# Patient Record
Sex: MALE | Race: WHITE | HISPANIC OR LATINO | Employment: OTHER | URBAN - METROPOLITAN AREA
[De-identification: names, ages, dates, MRNs, and addresses within clinical notes are randomized per-mention and may not be internally consistent; named-entity substitution may affect disease eponyms.]

---

## 2017-05-04 ENCOUNTER — HOSPITAL ENCOUNTER (EMERGENCY)
Facility: HOSPITAL | Age: 67
Discharge: HOME | End: 2017-05-04
Attending: EMERGENCY MEDICINE
Payer: MEDICARE

## 2017-05-04 ENCOUNTER — GENERIC CONVERSION - ENCOUNTER (OUTPATIENT)
Dept: OTHER | Facility: OTHER | Age: 67
End: 2017-05-04

## 2017-05-04 VITALS
HEIGHT: 67 IN | WEIGHT: 172 LBS | HEART RATE: 80 BPM | DIASTOLIC BLOOD PRESSURE: 99 MMHG | RESPIRATION RATE: 16 BRPM | SYSTOLIC BLOOD PRESSURE: 175 MMHG | BODY MASS INDEX: 27 KG/M2 | OXYGEN SATURATION: 97 %

## 2017-05-04 DIAGNOSIS — K64.5 EXTERNAL HEMORRHOID, THROMBOSED: Primary | ICD-10-CM

## 2017-05-04 PROCEDURE — 99283 EMERGENCY DEPT VISIT LOW MDM: CPT

## 2017-05-04 RX ORDER — LIDOCAINE 50 MG/G
1 OINTMENT TOPICAL 3 TIMES DAILY PRN
Qty: 35.44 G | Refills: 0 | Status: ON HOLD | OUTPATIENT
Start: 2017-05-04 | End: 2017-07-07

## 2017-05-08 ENCOUNTER — LAB REQUISITION (OUTPATIENT)
Dept: LAB | Facility: HOSPITAL | Age: 67
End: 2017-05-08
Payer: MEDICARE

## 2017-05-08 DIAGNOSIS — K64.5 PERIANAL VENOUS THROMBOSIS: ICD-10-CM

## 2017-05-08 PROCEDURE — 88304 TISSUE EXAM BY PATHOLOGIST: CPT | Performed by: COLON & RECTAL SURGERY

## 2017-07-06 ENCOUNTER — ANESTHESIA EVENT (OUTPATIENT)
Dept: GASTROENTEROLOGY | Facility: HOSPITAL | Age: 67
End: 2017-07-06
Payer: MEDICARE

## 2017-07-06 ENCOUNTER — GENERIC CONVERSION - ENCOUNTER (OUTPATIENT)
Dept: OTHER | Facility: OTHER | Age: 67
End: 2017-07-06

## 2017-07-06 ENCOUNTER — ALLSCRIPTS OFFICE VISIT (OUTPATIENT)
Dept: OTHER | Facility: OTHER | Age: 67
End: 2017-07-06

## 2017-07-07 ENCOUNTER — ANESTHESIA (OUTPATIENT)
Dept: GASTROENTEROLOGY | Facility: HOSPITAL | Age: 67
End: 2017-07-07
Payer: MEDICARE

## 2017-07-07 ENCOUNTER — HOSPITAL ENCOUNTER (OUTPATIENT)
Facility: HOSPITAL | Age: 67
Setting detail: OUTPATIENT SURGERY
Discharge: HOME | End: 2017-07-07
Attending: COLON & RECTAL SURGERY | Admitting: COLON & RECTAL SURGERY
Payer: MEDICARE

## 2017-07-07 VITALS
SYSTOLIC BLOOD PRESSURE: 129 MMHG | DIASTOLIC BLOOD PRESSURE: 82 MMHG | TEMPERATURE: 97 F | RESPIRATION RATE: 18 BRPM | OXYGEN SATURATION: 96 % | HEIGHT: 67 IN | HEART RATE: 71 BPM | BODY MASS INDEX: 27.61 KG/M2 | WEIGHT: 175.93 LBS

## 2017-07-07 RX ORDER — VITAMIN B COMPLEX
1 CAPSULE ORAL DAILY
COMMUNITY

## 2017-07-07 RX ORDER — FLUTICASONE PROPIONATE 44 UG/1
1 AEROSOL, METERED RESPIRATORY (INHALATION) 2 TIMES DAILY
COMMUNITY
End: 2018-05-16

## 2017-07-07 RX ORDER — PROPOFOL 10 MG/ML
INJECTION, EMULSION INTRAVENOUS AS NEEDED
Status: DISCONTINUED | OUTPATIENT
Start: 2017-07-07 | End: 2017-07-07 | Stop reason: SURG

## 2017-07-07 RX ORDER — MULTIVIT-MIN/IRON FUM/FOLIC AC 7.5 MG-4
1 TABLET ORAL DAILY
COMMUNITY

## 2017-07-07 RX ORDER — SODIUM CHLORIDE, SODIUM LACTATE, POTASSIUM CHLORIDE, CALCIUM CHLORIDE 600; 310; 30; 20 MG/100ML; MG/100ML; MG/100ML; MG/100ML
100 INJECTION, SOLUTION INTRAVENOUS CONTINUOUS
Status: DISCONTINUED | OUTPATIENT
Start: 2017-07-07 | End: 2017-07-07 | Stop reason: HOSPADM

## 2017-07-07 RX ADMIN — PROPOFOL 50 MG: 10 INJECTION, EMULSION INTRAVENOUS at 12:35

## 2017-07-07 RX ADMIN — SODIUM CHLORIDE, SODIUM LACTATE, POTASSIUM CHLORIDE, AND CALCIUM CHLORIDE 100 ML/HR: .6; .31; .03; .02 INJECTION, SOLUTION INTRAVENOUS at 12:26

## 2017-07-07 RX ADMIN — PROPOFOL 100 MG: 10 INJECTION, EMULSION INTRAVENOUS at 12:31

## 2017-07-07 RX ADMIN — PROPOFOL 50 MG: 10 INJECTION, EMULSION INTRAVENOUS at 12:38

## 2018-01-13 VITALS
OXYGEN SATURATION: 97 % | WEIGHT: 175 LBS | TEMPERATURE: 98.4 F | DIASTOLIC BLOOD PRESSURE: 88 MMHG | BODY MASS INDEX: 29.16 KG/M2 | HEIGHT: 65 IN | SYSTOLIC BLOOD PRESSURE: 130 MMHG | HEART RATE: 68 BPM

## 2018-04-23 ENCOUNTER — TRANSCRIBE ORDERS (OUTPATIENT)
Dept: INTERNAL MEDICINE CLINIC | Facility: CLINIC | Age: 68
End: 2018-04-23

## 2018-04-23 DIAGNOSIS — Z86.19 HISTORY OF HEPATITIS A VIRUS INFECTION: ICD-10-CM

## 2018-04-23 DIAGNOSIS — K21.9 GASTROESOPHAGEAL REFLUX DISEASE WITHOUT ESOPHAGITIS: ICD-10-CM

## 2018-04-23 DIAGNOSIS — Z86.74 HISTORY OF CARDIAC ARREST: ICD-10-CM

## 2018-04-23 DIAGNOSIS — Z86.73 HISTORY OF STROKE: ICD-10-CM

## 2018-04-23 DIAGNOSIS — G43.501 PERSISTENT MIGRAINE AURA WITHOUT CEREBRAL INFARCTION AND WITH STATUS MIGRAINOSUS, NOT INTRACTABLE: ICD-10-CM

## 2018-04-23 DIAGNOSIS — Z12.5 SCREENING FOR MALIGNANT NEOPLASM OF PROSTATE: ICD-10-CM

## 2018-04-23 DIAGNOSIS — J45.40 MODERATE PERSISTENT ASTHMA, UNSPECIFIED WHETHER COMPLICATED: ICD-10-CM

## 2018-04-23 DIAGNOSIS — N40.0 BENIGN PROSTATIC HYPERPLASIA WITHOUT LOWER URINARY TRACT SYMPTOMS: Primary | ICD-10-CM

## 2018-04-23 RX ORDER — FLUTICASONE PROPIONATE 44 UG/1
AEROSOL, METERED RESPIRATORY (INHALATION)
COMMUNITY
End: 2018-05-16 | Stop reason: SDUPTHER

## 2018-04-23 RX ORDER — ACETAMINOPHEN, ASPIRIN AND CAFFEINE 250; 250; 65 MG/1; MG/1; MG/1
1 TABLET, FILM COATED ORAL EVERY 6 HOURS PRN
COMMUNITY

## 2018-04-25 ENCOUNTER — TELEPHONE (OUTPATIENT)
Dept: INTERNAL MEDICINE CLINIC | Facility: CLINIC | Age: 68
End: 2018-04-25

## 2018-04-25 DIAGNOSIS — Z87.442 HX OF RENAL CALCULI: Primary | ICD-10-CM

## 2018-04-25 DIAGNOSIS — N40.0 BENIGN PROSTATIC HYPERPLASIA WITHOUT LOWER URINARY TRACT SYMPTOMS: ICD-10-CM

## 2018-04-26 ENCOUNTER — APPOINTMENT (OUTPATIENT)
Dept: LAB | Facility: CLINIC | Age: 68
End: 2018-04-26
Payer: MEDICARE

## 2018-04-26 DIAGNOSIS — Z12.5 SCREENING FOR MALIGNANT NEOPLASM OF PROSTATE: ICD-10-CM

## 2018-04-26 DIAGNOSIS — J45.40 MODERATE PERSISTENT ASTHMA, UNSPECIFIED WHETHER COMPLICATED: ICD-10-CM

## 2018-04-26 DIAGNOSIS — Z86.19 HISTORY OF HEPATITIS A VIRUS INFECTION: ICD-10-CM

## 2018-04-26 DIAGNOSIS — G43.501 PERSISTENT MIGRAINE AURA WITHOUT CEREBRAL INFARCTION AND WITH STATUS MIGRAINOSUS, NOT INTRACTABLE: ICD-10-CM

## 2018-04-26 DIAGNOSIS — Z86.73 HISTORY OF STROKE: ICD-10-CM

## 2018-04-26 DIAGNOSIS — N40.0 BENIGN PROSTATIC HYPERPLASIA WITHOUT LOWER URINARY TRACT SYMPTOMS: ICD-10-CM

## 2018-04-26 DIAGNOSIS — Z86.74 HISTORY OF CARDIAC ARREST: ICD-10-CM

## 2018-04-26 DIAGNOSIS — K21.9 GASTROESOPHAGEAL REFLUX DISEASE WITHOUT ESOPHAGITIS: ICD-10-CM

## 2018-04-26 LAB
ALBUMIN SERPL BCP-MCNC: 3.8 G/DL (ref 3.5–5)
ALP SERPL-CCNC: 51 U/L (ref 46–116)
ALT SERPL W P-5'-P-CCNC: 47 U/L (ref 12–78)
ANION GAP SERPL CALCULATED.3IONS-SCNC: 7 MMOL/L (ref 4–13)
AST SERPL W P-5'-P-CCNC: 26 U/L (ref 5–45)
BACTERIA UR QL AUTO: ABNORMAL /HPF
BASOPHILS # BLD AUTO: 0.03 THOUSANDS/ΜL (ref 0–0.1)
BASOPHILS NFR BLD AUTO: 1 % (ref 0–1)
BILIRUB DIRECT SERPL-MCNC: 0.14 MG/DL (ref 0–0.2)
BILIRUB SERPL-MCNC: 0.6 MG/DL (ref 0.2–1)
BILIRUB UR QL STRIP: NEGATIVE
BUN SERPL-MCNC: 15 MG/DL (ref 5–25)
CALCIUM SERPL-MCNC: 8.4 MG/DL (ref 8.3–10.1)
CHLORIDE SERPL-SCNC: 101 MMOL/L (ref 100–108)
CHOLEST SERPL-MCNC: 166 MG/DL (ref 50–200)
CLARITY UR: CLEAR
CO2 SERPL-SCNC: 30 MMOL/L (ref 21–32)
COLOR UR: YELLOW
CREAT SERPL-MCNC: 0.89 MG/DL (ref 0.6–1.3)
EOSINOPHIL # BLD AUTO: 0.01 THOUSAND/ΜL (ref 0–0.61)
EOSINOPHIL NFR BLD AUTO: 0 % (ref 0–6)
ERYTHROCYTE [DISTWIDTH] IN BLOOD BY AUTOMATED COUNT: 12.1 % (ref 11.6–15.1)
GFR SERPL CREATININE-BSD FRML MDRD: 87 ML/MIN/1.73SQ M
GLUCOSE P FAST SERPL-MCNC: 94 MG/DL (ref 65–99)
GLUCOSE UR STRIP-MCNC: NEGATIVE MG/DL
HCT VFR BLD AUTO: 45.9 % (ref 36.5–49.3)
HDLC SERPL-MCNC: 51 MG/DL (ref 40–60)
HGB BLD-MCNC: 15.6 G/DL (ref 12–17)
HGB UR QL STRIP.AUTO: ABNORMAL
KETONES UR STRIP-MCNC: NEGATIVE MG/DL
LDLC SERPL CALC-MCNC: 82 MG/DL (ref 0–100)
LEUKOCYTE ESTERASE UR QL STRIP: NEGATIVE
LYMPHOCYTES # BLD AUTO: 1.4 THOUSANDS/ΜL (ref 0.6–4.47)
LYMPHOCYTES NFR BLD AUTO: 34 % (ref 14–44)
MCH RBC QN AUTO: 29.6 PG (ref 26.8–34.3)
MCHC RBC AUTO-ENTMCNC: 34 G/DL (ref 31.4–37.4)
MCV RBC AUTO: 87 FL (ref 82–98)
MONOCYTES # BLD AUTO: 0.6 THOUSAND/ΜL (ref 0.17–1.22)
MONOCYTES NFR BLD AUTO: 14 % (ref 4–12)
NEUTROPHILS # BLD AUTO: 2.14 THOUSANDS/ΜL (ref 1.85–7.62)
NEUTS SEG NFR BLD AUTO: 51 % (ref 43–75)
NITRITE UR QL STRIP: NEGATIVE
NON-SQ EPI CELLS URNS QL MICRO: ABNORMAL /HPF
NONHDLC SERPL-MCNC: 115 MG/DL
PH UR STRIP.AUTO: 6 [PH] (ref 4.5–8)
PLATELET # BLD AUTO: 161 THOUSANDS/UL (ref 149–390)
PMV BLD AUTO: 9.9 FL (ref 8.9–12.7)
POTASSIUM SERPL-SCNC: 3.8 MMOL/L (ref 3.5–5.3)
PROT SERPL-MCNC: 6.9 G/DL (ref 6.4–8.2)
PROT UR STRIP-MCNC: NEGATIVE MG/DL
PSA SERPL-MCNC: 0.8 NG/ML (ref 0–4)
RBC # BLD AUTO: 5.27 MILLION/UL (ref 3.88–5.62)
RBC #/AREA URNS AUTO: ABNORMAL /HPF
SODIUM SERPL-SCNC: 138 MMOL/L (ref 136–145)
SP GR UR STRIP.AUTO: 1.01 (ref 1–1.03)
TRIGL SERPL-MCNC: 165 MG/DL
UROBILINOGEN UR QL STRIP.AUTO: 0.2 E.U./DL
WBC # BLD AUTO: 4.18 THOUSAND/UL (ref 4.31–10.16)
WBC #/AREA URNS AUTO: ABNORMAL /HPF

## 2018-04-26 PROCEDURE — 80061 LIPID PANEL: CPT

## 2018-04-26 PROCEDURE — 82248 BILIRUBIN DIRECT: CPT

## 2018-04-26 PROCEDURE — 36415 COLL VENOUS BLD VENIPUNCTURE: CPT

## 2018-04-26 PROCEDURE — 81001 URINALYSIS AUTO W/SCOPE: CPT

## 2018-04-26 PROCEDURE — 84153 ASSAY OF PSA TOTAL: CPT

## 2018-04-26 PROCEDURE — 80053 COMPREHEN METABOLIC PANEL: CPT

## 2018-04-26 PROCEDURE — 85025 COMPLETE CBC W/AUTO DIFF WBC: CPT

## 2018-05-09 ENCOUNTER — OFFICE VISIT (OUTPATIENT)
Dept: INTERNAL MEDICINE CLINIC | Facility: CLINIC | Age: 68
End: 2018-05-09
Payer: MEDICARE

## 2018-05-09 VITALS
OXYGEN SATURATION: 97 % | WEIGHT: 179.2 LBS | BODY MASS INDEX: 30.59 KG/M2 | DIASTOLIC BLOOD PRESSURE: 80 MMHG | HEIGHT: 64 IN | TEMPERATURE: 98.4 F | HEART RATE: 82 BPM | SYSTOLIC BLOOD PRESSURE: 130 MMHG

## 2018-05-09 DIAGNOSIS — G43.009 MIGRAINE WITHOUT AURA AND WITHOUT STATUS MIGRAINOSUS, NOT INTRACTABLE: Primary | ICD-10-CM

## 2018-05-09 DIAGNOSIS — J30.1 SEASONAL ALLERGIC RHINITIS DUE TO POLLEN: ICD-10-CM

## 2018-05-09 DIAGNOSIS — R05.9 COUGH: ICD-10-CM

## 2018-05-09 PROBLEM — K21.9 GERD WITHOUT ESOPHAGITIS: Status: ACTIVE | Noted: 2017-07-06

## 2018-05-09 PROCEDURE — 99214 OFFICE O/P EST MOD 30 MIN: CPT | Performed by: INTERNAL MEDICINE

## 2018-05-09 RX ORDER — BUTALBITAL, ACETAMINOPHEN AND CAFFEINE 50; 325; 40 MG/1; MG/1; MG/1
1 TABLET ORAL EVERY 4 HOURS PRN
Qty: 30 TABLET | Refills: 0 | Status: SHIPPED | OUTPATIENT
Start: 2018-05-09 | End: 2018-11-16

## 2018-05-09 RX ORDER — METHYLPREDNISOLONE 4 MG/1
TABLET ORAL
Qty: 21 TABLET | Refills: 0 | Status: SHIPPED | OUTPATIENT
Start: 2018-05-09 | End: 2018-05-16 | Stop reason: SDUPTHER

## 2018-05-09 NOTE — PROGRESS NOTES
Assessment/Plan:    Cough    Symptoms likely due to post infectious cough syndrome  Recommend he continue with Benadryl and Flonase, as well as as needed albuterol inhaler  Due to symptoms of wheezing, will start steroid taper pack (which will also assist with migraine headaches)  Seasonal allergic rhinitis due to pollen    Continue with Benadryl and Flonase  Migraine without aura and without status migrainosus, not intractable   Will prescribe Fioricet for acute breakthrough headaches as well as a steroid taper pack  Patient advised to contact our offices for worsening of symptoms  Diagnoses and all orders for this visit:    Migraine without aura and without status migrainosus, not intractable  -     butalbital-acetaminophen-caffeine (FIORICET,ESGIC) -40 mg per tablet; Take 1 tablet by mouth every 4 (four) hours as needed for headaches  -     Methylprednisolone 4 MG TBPK; Use as directed on package    Seasonal allergic rhinitis due to pollen    Cough  -     Methylprednisolone 4 MG TBPK; Use as directed on package        Time spent during encounter: 25 minutes  Subjective:      Patient ID: Alexandro Esteban is a 69 y o  male  69year old male is seen today with acute symptoms of worsening migraine headaches  He does report a history of migraine headaches to which back in [facility] he gets approximately 1-2 week however since coming to [facility] to visit family, he has had them daily  He has tried Excedrin migraine without improvement of symptoms  he was visiting [place] in April and went to an urgent care center on 04/30/2018 with symptoms of URI, to which he was diagnosed with acute bronchitis and was treated with antibiotics (azithromycin)  He completed antibiotic therapy however continues to have a persistent cough  He does use an albuterol inhaler as needed for cough, which he admits to using more so than baseline    He also has seasonal allergies to which he takes Benadryl and Flonase  Migraine    This is a recurrent problem  The current episode started 1 to 4 weeks ago  The problem occurs daily  The problem has been unchanged  The pain is located in the left unilateral region  The pain does not radiate  The pain quality is similar to prior headaches  The patient is experiencing no pain  Associated symptoms include coughing, insomnia, nausea, rhinorrhea and vomiting  Pertinent negatives include no abdominal pain, abnormal behavior, anorexia, back pain, blurred vision, dizziness, drainage, ear pain, eye pain, eye redness, eye watering, facial sweating, fever, hearing loss, loss of balance, muscle aches, neck pain, numbness, phonophobia, photophobia, scalp tenderness, seizures, sinus pressure, sore throat, swollen glands, tingling, tinnitus, visual change, weakness or weight loss  Exacerbated by: cleaning products, strong odors  He has tried Excedrin for the symptoms  The treatment provided mild relief  His past medical history is significant for migraine headaches  Cough   This is a new problem  The current episode started 1 to 4 weeks ago  The problem has been unchanged  The problem occurs constantly  The cough is non-productive  Associated symptoms include rhinorrhea, shortness of breath and wheezing  Pertinent negatives include no chest pain, chills, ear pain, eye redness, fever, postnasal drip, sore throat or weight loss  Nothing aggravates the symptoms  There is no history of environmental allergies  The following portions of the patient's history were reviewed and updated as appropriate: allergies, current medications, past family history, past medical history, past social history, past surgical history and problem list     Review of Systems   Constitutional: Negative  Negative for chills, fatigue, fever and weight loss  HENT: Positive for rhinorrhea  Negative for congestion, ear pain, hearing loss, postnasal drip, sinus pressure, sore throat and tinnitus      Eyes: Negative  Negative for blurred vision, photophobia, pain and redness  Respiratory: Positive for cough, shortness of breath and wheezing  Negative for chest tightness  Cardiovascular: Negative for chest pain and palpitations  Gastrointestinal: Positive for nausea and vomiting  Negative for abdominal distention, abdominal pain, anorexia, blood in stool, constipation and diarrhea  Endocrine: Negative  Genitourinary: Negative for difficulty urinating, dysuria and hematuria  Musculoskeletal: Negative  Negative for back pain and neck pain  Skin: Negative  Allergic/Immunologic: Negative for environmental allergies and food allergies  Neurological: Negative  Negative for dizziness, tingling, seizures, weakness, numbness and loss of balance  Hematological: Negative for adenopathy  Psychiatric/Behavioral: Negative for agitation, behavioral problems, confusion and sleep disturbance  The patient has insomnia            Past Medical History:   Diagnosis Date    Asthma     Benign prostatic hyperplasia     Blood transfusion reaction     Bronchitis 04/30/2018    [facility]     [facility]    Diverticulosis     GERD (gastroesophageal reflux disease)     Hepatitis A     History of colonic polyps     Internal bleeding     2000    Migraine     Osteoarthritis     Seizures ([facility] )     Stroke ([facility] )     2001, 2003         Current Outpatient Prescriptions:     aspirin 81 MG tablet, Take by mouth, Disp: , Rfl:     aspirin-acetaminophen-caffeine (EXCEDRIN MIGRAINE) 250-250-65 MG per tablet, Take by mouth, Disp: , Rfl:     b complex vitamins capsule, Take 1 capsule by mouth daily, Disp: , Rfl:     fluticasone (FLOVENT HFA) 44 mcg/act inhaler, Inhale 1 puff 2 (two) times a day, Disp: , Rfl:     fluticasone (FLOVENT HFA) 44 mcg/act inhaler, Inhale, Disp: , Rfl:     Multiple Vitamins-Minerals (MULTIVITAMIN WITH MINERALS) tablet, Take 1 tablet by mouth daily, Disp: , Rfl:     NON FORMULARY, , Disp: , Rfl:     omeprazole (PriLOSEC) 20 mg delayed release capsule, Take 20 mg by mouth daily  , Disp: , Rfl:     butalbital-acetaminophen-caffeine (FIORICET,ESGIC) -40 mg per tablet, Take 1 tablet by mouth every 4 (four) hours as needed for headaches, Disp: 30 tablet, Rfl: 0    Methylprednisolone 4 MG TBPK, Use as directed on package, Disp: 21 tablet, Rfl: 0    Allergies   Allergen Reactions    Chlorzoxazone     Dust Mite Extract     Iodinated Diagnostic Agents     Other      Annotation - [id number]: petrolium derivatives, and perfume    Pollen Extract     Codeine Rash       Social History   Past Surgical History:   Procedure Laterality Date    APPENDECTOMY      DENTAL SURGERY      wisdom tooth extraction    HEMORRHOID SURGERY      MYRINGOTOMY W/ TUBES      [place] COLONOSCOPY FLX DX W/COLLJ SPEC WHEN PFRMD N/A 7/7/2017    Procedure: COLONOSCOPY;  Surgeon: Carlyn Peabody, MD;  [facility];   Service: Colorectal    PROSTATE SURGERY      TONSILLECTOMY      VASCULAR SURGERY      VASECTOMY       Family History   Problem Relation Age of Onset    Cancer Father      colon    Diabetes Father     Cancer Brother     Diabetes Mother     Lupus Sister      systemic-erythematosus    Diabetes Family     Kidney cancer Family        Objective:  /80 (BP Location: Left arm, Patient Position: Sitting, Cuff Size: Standard)   Pulse 82   Temp 98 4 °F (36 9 °C) (Oral)   Ht 5' 4" (1 626 m)   Wt 81 3 kg (179 lb 3 2 oz)   SpO2 97%   BMI 30 76 kg/m²     Recent Results (from the past 1344 hour(s))   UA (URINE) with reflex to Microscopic    Collection Time: 04/26/18  6:10 AM   Result Value Ref Range    Color, UA Yellow     Clarity, UA Clear     Specific [place], UA 1 015 1 003 - 1 030    pH, UA 6 0 4 5 - 8 0    Leukocytes, UA Negative Negative    Nitrite, UA Negative Negative    Protein, UA Negative Negative mg/dl    Glucose, UA Negative Negative mg/dl    Ketones, UA Negative Negative mg/dl Urobilinogen, UA 0 2 0 2, 1 0 E U /dl E U /dl    Bilirubin, UA Negative Negative    Blood, UA Trace-Intact (A) Negative   CBC and differential    Collection Time: 04/26/18  6:10 AM   Result Value Ref Range    WBC 4 18 (L) 4 31 - 10 16 Thousand/uL    RBC 5 27 3 88 - 5 62 Million/uL    Hemoglobin 15 6 12 0 - 17 0 g/dL    Hematocrit 45 9 36 5 - 49 3 %    MCV 87 82 - 98 fL    MCH 29 6 26 8 - 34 3 pg    MCHC 34 0 31 4 - 37 4 g/dL    RDW 12 1 11 6 - 15 1 %    MPV 9 9 8 9 - 12 7 fL    Platelets 454 109 - 658 Thousands/uL    Neutrophils Relative 51 43 - 75 %    Lymphocytes Relative 34 14 - 44 %    Monocytes Relative 14 (H) 4 - 12 %    Eosinophils Relative 0 0 - 6 %    Basophils Relative 1 0 - 1 %    Neutrophils Absolute 2 14 1 85 - 7 62 Thousands/µL    Lymphocytes Absolute 1 40 0 60 - 4 47 Thousands/µL    Monocytes Absolute 0 60 0 17 - 1 22 Thousand/µL    Eosinophils Absolute 0 01 0 00 - 0 61 Thousand/µL    Basophils Absolute 0 03 0 00 - 0 10 Thousands/µL   Comprehensive metabolic panel    Collection Time: 04/26/18  6:10 AM   Result Value Ref Range    Sodium 138 136 - 145 mmol/L    Potassium 3 8 3 5 - 5 3 mmol/L    Chloride 101 100 - 108 mmol/L    CO2 30 21 - 32 mmol/L    Anion Gap 7 4 - 13 mmol/L    BUN 15 5 - 25 mg/dL    Creatinine 0 89 0 60 - 1 30 mg/dL    Glucose, Fasting 94 65 - 99 mg/dL    Calcium 8 4 8 3 - 10 1 mg/dL    AST 26 5 - 45 U/L    ALT 47 12 - 78 U/L    Alkaline Phosphatase 51 46 - 116 U/L    Total Protein 6 9 6 4 - 8 2 g/dL    Albumin 3 8 3 5 - 5 0 g/dL    Total Bilirubin 0 60 0 20 - 1 00 mg/dL    eGFR 87 ml/min/1 73sq m   Lipid panel    Collection Time: 04/26/18  6:10 AM   Result Value Ref Range    Cholesterol 166 50 - 200 mg/dL    Triglycerides 165 (H) <=150 mg/dL    HDL, Direct 51 40 - 60 mg/dL    LDL Calculated 82 0 - 100 mg/dL    Non-HDL-Chol (CHOL-HDL) 115 mg/dl   PSA Total, Diagnostic    Collection Time: 04/26/18  6:10 AM   Result Value Ref Range    PSA 0 8 0 0 - 4 0 ng/mL   Bilirubin, direct Collection Time: 04/26/18  6:10 AM   Result Value Ref Range    Bilirubin, Direct 0 14 0 00 - 0 20 mg/dL   Urine Microscopic    Collection Time: 04/26/18  6:10 AM   Result Value Ref Range    RBC, UA 0-1 (A) None Seen, 0-5 /hpf    WBC, UA 0-1 (A) None Seen, 0-5, 5-55, 5-65 /hpf    Epithelial Cells Occasional None Seen, Occasional /hpf    Bacteria, UA Occasional None Seen, Occasional /hpf            Physical Exam   Constitutional: He is oriented to person, place, and time  He appears well-developed and well-nourished  No distress  HENT:   Head: Normocephalic and atraumatic  Eyes: Conjunctivae and EOM are normal  Pupils are equal, round, and reactive to light  Right eye exhibits no discharge  Left eye exhibits no discharge  No scleral icterus  Neck: Normal range of motion  Neck supple  No JVD present  No thyromegaly present  Cardiovascular: Normal rate, regular rhythm, normal heart sounds and intact distal pulses  Exam reveals no gallop and no friction rub  No murmur heard  Pulmonary/Chest: Effort normal and breath sounds normal  No respiratory distress  He has no wheezes  He has no rales  He exhibits no tenderness  Abdominal: Soft  Bowel sounds are normal  He exhibits no distension and no mass  There is no tenderness  There is no rebound and no guarding  Musculoskeletal: Normal range of motion  He exhibits no edema, tenderness or deformity  Lymphadenopathy:     He has no cervical adenopathy  Neurological: He is alert and oriented to person, place, and time  He has normal reflexes  No cranial nerve deficit  Coordination normal    Skin: Skin is warm and dry  No rash noted  He is not diaphoretic  No erythema  No pallor  Psychiatric: He has a normal mood and affect  His behavior is normal  Judgment and thought content normal    Nursing note and vitals reviewed

## 2018-05-09 NOTE — ASSESSMENT & PLAN NOTE
Symptoms likely due to post infectious cough syndrome  Recommend he continue with Benadryl and Flonase, as well as as needed albuterol inhaler  Due to symptoms of wheezing, will start steroid taper pack (which will also assist with migraine headaches)

## 2018-05-16 ENCOUNTER — OFFICE VISIT (OUTPATIENT)
Dept: INTERNAL MEDICINE CLINIC | Facility: CLINIC | Age: 68
End: 2018-05-16
Payer: MEDICARE

## 2018-05-16 VITALS
HEIGHT: 64 IN | TEMPERATURE: 98.1 F | OXYGEN SATURATION: 98 % | DIASTOLIC BLOOD PRESSURE: 78 MMHG | SYSTOLIC BLOOD PRESSURE: 122 MMHG | WEIGHT: 179.3 LBS | HEART RATE: 87 BPM | BODY MASS INDEX: 30.61 KG/M2

## 2018-05-16 DIAGNOSIS — G44.209 MUSCLE TENSION HEADACHE: ICD-10-CM

## 2018-05-16 DIAGNOSIS — G43.009 MIGRAINE WITHOUT AURA AND WITHOUT STATUS MIGRAINOSUS, NOT INTRACTABLE: ICD-10-CM

## 2018-05-16 DIAGNOSIS — G47.00 INSOMNIA, UNSPECIFIED TYPE: ICD-10-CM

## 2018-05-16 DIAGNOSIS — N20.0 NEPHROLITHIASIS: ICD-10-CM

## 2018-05-16 DIAGNOSIS — R05.9 COUGH: ICD-10-CM

## 2018-05-16 DIAGNOSIS — J45.909 PERSISTENT ASTHMA, UNSPECIFIED ASTHMA SEVERITY, UNSPECIFIED WHETHER COMPLICATED: Primary | ICD-10-CM

## 2018-05-16 PROBLEM — IMO0002 PERSISTENT ASTHMA: Status: ACTIVE | Noted: 2018-05-16

## 2018-05-16 PROCEDURE — 99214 OFFICE O/P EST MOD 30 MIN: CPT | Performed by: INTERNAL MEDICINE

## 2018-05-16 RX ORDER — METHYLPREDNISOLONE 4 MG/1
TABLET ORAL
Qty: 21 TABLET | Refills: 0 | Status: SHIPPED | OUTPATIENT
Start: 2018-05-16 | End: 2018-07-30 | Stop reason: ALTCHOICE

## 2018-05-16 RX ORDER — METAXALONE 400 MG/1
400 TABLET ORAL 4 TIMES DAILY
Qty: 120 TABLET | Refills: 1 | Status: SHIPPED | OUTPATIENT
Start: 2018-05-16 | End: 2018-08-03 | Stop reason: ALTCHOICE

## 2018-05-16 RX ORDER — FLUTICASONE PROPIONATE 220 UG/1
1 AEROSOL, METERED RESPIRATORY (INHALATION) 2 TIMES DAILY
Qty: 3 INHALER | Refills: 3 | Status: SHIPPED | OUTPATIENT
Start: 2018-05-16

## 2018-05-16 RX ORDER — ALPRAZOLAM 1 MG/1
1 TABLET ORAL
Qty: 90 TABLET | Refills: 0 | Status: SHIPPED | OUTPATIENT
Start: 2018-05-16 | End: 2020-09-23 | Stop reason: SDUPTHER

## 2018-05-16 RX ORDER — CIPROFLOXACIN 500 MG/1
500 TABLET, FILM COATED ORAL EVERY 12 HOURS SCHEDULED
Qty: 20 TABLET | Refills: 1 | Status: SHIPPED | OUTPATIENT
Start: 2018-05-16 | End: 2018-05-26

## 2018-05-16 RX ORDER — AZITHROMYCIN 250 MG/1
250 TABLET, FILM COATED ORAL EVERY 24 HOURS
Qty: 6 TABLET | Refills: 1 | Status: SHIPPED | OUTPATIENT
Start: 2018-05-16 | End: 2018-05-22

## 2018-05-16 NOTE — PROGRESS NOTES
Assessment/Plan:    Muscle tension headache  Will add mild muscle relaxant as adjunctive therapy  Persistent asthma  Flovent was renewed  Patient was given prescription as a standby precaution for methylprednisolone and azithromycin should he experience recurrence of his severe asthma  Migraine without aura and without status migrainosus, not intractable    Symptoms appear to suggest muscle tension headache as possible etiology  We will add Skelaxin 400 mg up to 4 times daily as needed for the headache  Nephrolithiasis    Patient given a prescription for prophylactic use of ciprofloxacin should he experience an onset of renal colic and fever  Cough    Cough is improving  No additional therapies this very at this time as his asthma appears to be  Controlled and stable       Diagnoses and all orders for this visit:    Persistent asthma, unspecified asthma severity, unspecified whether complicated  -     fluticasone (FLOVENT HFA) 220 mcg/act inhaler; Inhale 1 puff 2 (two) times a day  -     azithromycin (ZITHROMAX) 250 mg tablet; Take 1 tablet (250 mg total) by mouth every 24 hours for 6 doses    Insomnia, unspecified type  -     ALPRAZolam (XANAX) 1 mg tablet; Take 1 tablet (1 mg total) by mouth daily at bedtime as needed for sleep    Migraine without aura and without status migrainosus, not intractable  -     Methylprednisolone 4 MG TBPK; Use as directed on package    Cough  -     Methylprednisolone 4 MG TBPK; Use as directed on package  -     azithromycin (ZITHROMAX) 250 mg tablet; Take 1 tablet (250 mg total) by mouth every 24 hours for 6 doses    Muscle tension headache  -     metaxalone (SKELAXIN) 400 MG tablet; Take 1 tablet (400 mg total) by mouth 4 (four) times a day    Nephrolithiasis  -     ciprofloxacin (CIPRO) 500 mg tablet; Take 1 tablet (500 mg total) by mouth every 12 (twelve) hours for 10 days          Subjective:      Patient ID: Deya Byrnes is a 69 y o  male       Patient presents for a follow-up office visit  He was recently treated for a flare of his asthma with steroids and azithromycin  He is presently doing much better  His cough has for the most part subsided  He complains of a headache originating in the occipital area of his scalp and then ranging around to his frontal scalp eventually involving his entire scalp  Headaches occur almost daily  There is no associated nausea or vomiting  He has no fevers  He denies any other neurological symptoms such as numbness, tingling or weakness, loss of balance, speech difficulties, visual disturbances etc He also has a history of nephrolithiasis  His most recent CT scan in 2016 showed a nonobstructing 1 mm stone in the right ureter  Reflux disease has been stable and asymptomatic  The patient had blood work prior to this visit  CBC, CMP and PSA are all within normal limits  His lipid profile is at goal with the exception of a minimally elevated triglyceride level of 165  His urinalysis showed trace RBCs otherwise unremarkable  Migraine    Associated symptoms include coughing  Family History   Problem Relation Age of Onset    Cancer Father      colon    Diabetes Father     Cancer Brother     Diabetes Mother     Lupus Sister      systemic-erythematosus    Diabetes Family     Kidney cancer Family      Social History     Social History    Marital status:      Spouse name: N/A    Number of children: N/A    Years of education: N/A     Occupational History    Not on file       Social History Main Topics    Smoking status: Never Smoker    Smokeless tobacco: Never Used      Comment: never a smoker as per [employer]    Alcohol use No    Drug use: No    Sexual activity: Not on file     Other Topics Concern    Not on file     Social History Narrative    No narrative on file     Past Medical History:   Diagnosis Date    Asthma     Benign prostatic hyperplasia     Blood transfusion reaction     Bronchitis 04/30/2018    Cardiac arrest ([place] [address] )     oct 2000    Diverticulosis     GERD (gastroesophageal reflux disease)     Hepatitis A     History of colonic polyps     Internal bleeding     2000    Migraine     Osteoarthritis     Seizures ([place] [address] )     Stroke ([place] [address] )     2001, 2003       Current Outpatient Prescriptions:     aspirin 81 MG tablet, Take 81 mg by mouth daily  , Disp: , Rfl:     aspirin-acetaminophen-caffeine (EXCEDRIN MIGRAINE) 250-250-65 MG per tablet, Take 1 tablet by mouth every 6 (six) hours as needed for headaches  , Disp: , Rfl:     b complex vitamins capsule, Take 1 capsule by mouth daily, Disp: , Rfl:     butalbital-acetaminophen-caffeine (FIORICET,ESGIC) -40 mg per tablet, Take 1 tablet by mouth every 4 (four) hours as needed for headaches, Disp: 30 tablet, Rfl: 0    Methylprednisolone 4 MG TBPK, Use as directed on package, Disp: 21 tablet, Rfl: 0    Multiple Vitamins-Minerals (MULTIVITAMIN WITH MINERALS) tablet, Take 1 tablet by mouth daily, Disp: , Rfl:     omeprazole (PriLOSEC) 20 mg delayed release capsule, Take 20 mg by mouth daily  , Disp: , Rfl:     ALPRAZolam (XANAX) 1 mg tablet, Take 1 tablet (1 mg total) by mouth daily at bedtime as needed for sleep, Disp: 90 tablet, Rfl: 0    azithromycin (ZITHROMAX) 250 mg tablet, Take 1 tablet (250 mg total) by mouth every 24 hours for 6 doses, Disp: 6 tablet, Rfl: 1    ciprofloxacin (CIPRO) 500 mg tablet, Take 1 tablet (500 mg total) by mouth every 12 (twelve) hours for 10 days, Disp: 20 tablet, Rfl: 1    fluticasone (FLOVENT HFA) 220 mcg/act inhaler, Inhale 1 puff 2 (two) times a day, Disp: 3 Inhaler, Rfl: 3    metaxalone (SKELAXIN) 400 MG tablet, Take 1 tablet (400 mg total) by mouth 4 (four) times a day, Disp: 120 tablet, Rfl: 1  Allergies   Allergen Reactions    Dust Mite Extract Shortness Of Breath and Allergic Rhinitis    Iodinated Diagnostic Agents Anaphylaxis    Pollen Extract Shortness Of Breath and Allergic Rhinitis    Other      Annotation - [id number]: petrolium derivatives, and perfume    Chlorzoxazone Rash    Codeine Itching and Rash     Past Surgical History:   Procedure Laterality Date    APPENDECTOMY      DENTAL SURGERY      wisdom tooth extraction    HEMORRHOID SURGERY      MYRINGOTOMY W/ TUBES      [place] COLONOSCOPY FLX DX W/COLLJ SPEC WHEN PFRMD N/A 7/7/2017    Procedure: COLONOSCOPY;  Surgeon: Fred Solo MD;  [facility]; Service: Colorectal    PROSTATE SURGERY      TONSILLECTOMY      VASCULAR SURGERY      VASECTOMY           Review of Systems   Constitutional: Negative  HENT: Negative  Eyes: Negative  Respiratory: Positive for cough and wheezing  Cardiovascular: Negative  Gastrointestinal: Negative  Endocrine: Negative  Musculoskeletal: Negative  Allergic/Immunologic: Negative  Neurological: Positive for headaches  Hematological: Negative  Psychiatric/Behavioral: Negative  Objective:      /78 (BP Location: Left arm, Patient Position: Sitting, Cuff Size: Standard)   Pulse 87   Temp 98 1 °F (36 7 °C) (Oral)   Ht 5' 4" (1 626 m)   Wt 81 3 kg (179 lb 4 8 oz)   SpO2 98%   BMI 30 78 kg/m²          Physical Exam   Constitutional: He is oriented to person, place, and time  He appears well-developed and well-nourished  HENT:   Head: Normocephalic and atraumatic  Right Ear: External ear normal    Left Ear: External ear normal    Mouth/Throat: Oropharynx is clear and moist    Eyes: Conjunctivae and EOM are normal  Pupils are equal, round, and reactive to light  No scleral icterus  Neck: Normal range of motion  Neck supple  No JVD present  No tracheal deviation present  No thyromegaly present  Cardiovascular: Normal rate, regular rhythm and normal heart sounds  No murmur heard  Pulmonary/Chest: Effort normal  No stridor  No respiratory distress   He has wheezes (Scattered mild expiratory wheezes noted in the right upper lung field and left lower lung field  )  He has no rales  Abdominal: Soft  Bowel sounds are normal  He exhibits no distension  Genitourinary:   Genitourinary Comments: No abnormalities   Musculoskeletal: Normal range of motion  He exhibits no edema or deformity  Lymphadenopathy:     He has no cervical adenopathy  Neurological: He is alert and oriented to person, place, and time  No cranial nerve deficit  Coordination normal    Skin: Skin is warm and dry  No rash noted  Psychiatric: He has a normal mood and affect  Thought content normal    Vitals reviewed

## 2018-05-16 NOTE — ASSESSMENT & PLAN NOTE
Cough is improving    No additional therapies this very at this time as his asthma appears to be  Controlled and stable

## 2018-05-16 NOTE — ASSESSMENT & PLAN NOTE
Symptoms appear to suggest muscle tension headache as possible etiology  We will add Skelaxin 400 mg up to 4 times daily as needed for the headache

## 2018-05-16 NOTE — ASSESSMENT & PLAN NOTE
Flovent was renewed  Patient was given prescription as a standby precaution for methylprednisolone and azithromycin should he experience recurrence of his severe asthma

## 2018-05-16 NOTE — ASSESSMENT & PLAN NOTE
Patient given a prescription for prophylactic use of ciprofloxacin should he experience an onset of renal colic and fever

## 2018-06-18 ENCOUNTER — PREP FOR PROCEDURE (OUTPATIENT)
Dept: GASTROENTEROLOGY | Facility: AMBULARY SURGERY CENTER | Age: 68
End: 2018-06-18

## 2018-06-18 DIAGNOSIS — K21.9 GASTROESOPHAGEAL REFLUX DISEASE, ESOPHAGITIS PRESENCE NOT SPECIFIED: Primary | ICD-10-CM

## 2018-07-25 ENCOUNTER — TELEPHONE (OUTPATIENT)
Dept: UROLOGY | Facility: AMBULATORY SURGERY CENTER | Age: 68
End: 2018-07-25

## 2018-07-30 ENCOUNTER — APPOINTMENT (OUTPATIENT)
Dept: LAB | Facility: AMBULARY SURGERY CENTER | Age: 68
End: 2018-07-30
Attending: INTERNAL MEDICINE
Payer: MEDICARE

## 2018-07-30 ENCOUNTER — OFFICE VISIT (OUTPATIENT)
Dept: GASTROENTEROLOGY | Facility: AMBULARY SURGERY CENTER | Age: 68
End: 2018-07-30
Payer: MEDICARE

## 2018-07-30 ENCOUNTER — OFFICE VISIT (OUTPATIENT)
Dept: INTERNAL MEDICINE CLINIC | Age: 68
End: 2018-07-30
Payer: MEDICARE

## 2018-07-30 VITALS
TEMPERATURE: 97.9 F | HEART RATE: 82 BPM | DIASTOLIC BLOOD PRESSURE: 100 MMHG | OXYGEN SATURATION: 97 % | SYSTOLIC BLOOD PRESSURE: 144 MMHG

## 2018-07-30 VITALS
WEIGHT: 175 LBS | TEMPERATURE: 96.9 F | DIASTOLIC BLOOD PRESSURE: 92 MMHG | SYSTOLIC BLOOD PRESSURE: 140 MMHG | HEART RATE: 98 BPM | BODY MASS INDEX: 27.47 KG/M2 | RESPIRATION RATE: 18 BRPM | HEIGHT: 67 IN

## 2018-07-30 DIAGNOSIS — R11.2 INTRACTABLE VOMITING WITH NAUSEA, UNSPECIFIED VOMITING TYPE: Primary | ICD-10-CM

## 2018-07-30 DIAGNOSIS — R03.0 ELEVATED BP WITHOUT DIAGNOSIS OF HYPERTENSION: ICD-10-CM

## 2018-07-30 DIAGNOSIS — G43.009 MIGRAINE WITHOUT AURA AND WITHOUT STATUS MIGRAINOSUS, NOT INTRACTABLE: ICD-10-CM

## 2018-07-30 DIAGNOSIS — G44.209 MUSCLE TENSION HEADACHE: ICD-10-CM

## 2018-07-30 DIAGNOSIS — Z86.010 HISTORY OF COLON POLYPS: ICD-10-CM

## 2018-07-30 DIAGNOSIS — R10.12 LEFT UPPER QUADRANT PAIN: ICD-10-CM

## 2018-07-30 DIAGNOSIS — R10.13 DYSPEPSIA: Primary | ICD-10-CM

## 2018-07-30 DIAGNOSIS — R10.13 DYSPEPSIA: ICD-10-CM

## 2018-07-30 PROCEDURE — 87340 HEPATITIS B SURFACE AG IA: CPT

## 2018-07-30 PROCEDURE — 99214 OFFICE O/P EST MOD 30 MIN: CPT | Performed by: INTERNAL MEDICINE

## 2018-07-30 PROCEDURE — 99204 OFFICE O/P NEW MOD 45 MIN: CPT | Performed by: INTERNAL MEDICINE

## 2018-07-30 PROCEDURE — 96372 THER/PROPH/DIAG INJ SC/IM: CPT | Performed by: INTERNAL MEDICINE

## 2018-07-30 PROCEDURE — 86704 HEP B CORE ANTIBODY TOTAL: CPT

## 2018-07-30 PROCEDURE — 36415 COLL VENOUS BLD VENIPUNCTURE: CPT

## 2018-07-30 PROCEDURE — 86803 HEPATITIS C AB TEST: CPT

## 2018-07-30 PROCEDURE — 86705 HEP B CORE ANTIBODY IGM: CPT

## 2018-07-30 RX ORDER — NORTRIPTYLINE HYDROCHLORIDE 25 MG/1
25 CAPSULE ORAL
Qty: 30 CAPSULE | Refills: 0 | Status: SHIPPED | OUTPATIENT
Start: 2018-07-30 | End: 2018-08-02 | Stop reason: SDUPTHER

## 2018-07-30 RX ORDER — ONDANSETRON 2 MG/ML
4 INJECTION INTRAMUSCULAR; INTRAVENOUS ONCE
Status: COMPLETED | OUTPATIENT
Start: 2018-07-30 | End: 2018-07-30

## 2018-07-30 RX ORDER — ONDANSETRON 4 MG/1
4 TABLET, ORALLY DISINTEGRATING ORAL EVERY 6 HOURS PRN
Qty: 30 TABLET | Refills: 0 | Status: SHIPPED | OUTPATIENT
Start: 2018-07-30 | End: 2020-09-23

## 2018-07-30 RX ORDER — METAXALONE 800 MG/1
TABLET ORAL
Refills: 1 | COMMUNITY
Start: 2018-05-16 | End: 2018-07-30 | Stop reason: SDUPTHER

## 2018-07-30 RX ORDER — FENOPROFEN CALCIUM 400 MG/1
1 CAPSULE ORAL DAILY
COMMUNITY

## 2018-07-30 RX ADMIN — ONDANSETRON 4 MG: 2 INJECTION INTRAMUSCULAR; INTRAVENOUS at 13:49

## 2018-07-30 NOTE — PROGRESS NOTES
Consultation -  [facility]  Katelyn Bakari Centeno 69 y o  male MRN: [id number]  Unit/Bed#:  Encounter: [id number]        Consults    ASSESSMENT/PLAN:   1  Dyspepsia-longstanding symptoms, prior history of esophagitis and H pylori, on omeprazole 20 mg with breakthrough symptoms   - Patient was explained about the lifestyle and dietary modifications  Advised to avoid fatty foods, chocolates, caffeine, alcohol and any other triggering foods  Avoid eating for at least 3 hours before going to bed   -recent blood work shows normal hemoglobin, normal liver functions  -will plan for EGD to assess for Quinn's and also to assess for eradication of H pylori  2 ?  History of hepatitis A-liver functions are normal, will obtain chronic hepatitis panel  3   Personal history of polyps and family history of colon cancer in brother and father-previous colonoscopy in 2017 without polyps, moderate diverticulosis in the sigmoid along with internal and external hemorrhoids  Continue high-fiber diet  Plan to repeat colonoscopy in 2021             ______________________________________________________________________    Reason for Consult / Principal Problem: [unfilled]    HPI: Mick Isidro is a 69y o  year old male with history of acid reflux, H pylori, polyps, asthma, presents for evaluation of left upper quadrant abdominal pain along with bloating  Patient states that symptoms are often postprandial   He has been on omeprazole 20 mg for least 7-8 years with good control of acid reflux symptoms, states that he very rarely gets breakthrough heartburn symptoms  Denies unintentional weight loss or NSAID use  He denies hematemesis, melena or change in bowel habits  He underwent colonoscopy last year and was noted to have moderate sigmoid diverticulosis  He does have family history of colon cancer in brother and father  Review of Systems:   The remainder of the review of systems was negative except for the pertinent positives noted in HPI  Historical Information   Past Medical History:   Diagnosis Date    Asthma     Benign prostatic hyperplasia     Blood transfusion reaction     Bronchitis 04/30/2018    Cardiac arrest ([facility] )     oct 2000    Diverticulosis     GERD (gastroesophageal reflux disease)     Hepatitis A     History of colonic polyps     Internal bleeding     2000    Migraine     Osteoarthritis     Seizures ([facility] )     Stroke ([place] [address] )     2001, 2003     Past Surgical History:   Procedure Laterality Date    APPENDECTOMY      DENTAL SURGERY      wisdom tooth extraction    HEMORRHOID SURGERY      MYRINGOTOMY W/ TUBES      [place] COLONOSCOPY FLX DX W/COLLJ SPEC WHEN PFRMD N/A 7/7/2017    Procedure: COLONOSCOPY;  Surgeon: Marina Wiseman MD;  [facility]; Service: Colorectal    PROSTATE SURGERY      TONSILLECTOMY      VASCULAR SURGERY      VASECTOMY       Social History   History   Alcohol Use No     History   Drug Use No     History   Smoking Status    Never Smoker   Smokeless Tobacco    Never Used     Comment: never a smoker as per [employer]     Family History   Problem Relation Age of Onset    Cancer Father         colon    Diabetes Father     Cancer Brother     Diabetes Mother     Lupus Sister         systemic-erythematosus    Diabetes Family     Kidney cancer Family        Meds/Allergies       (Not in a hospital admission)  No current facility-administered medications for this visit  Allergies   Allergen Reactions    Dust Mite Extract Shortness Of Breath and Allergic Rhinitis    Iodinated Diagnostic Agents Anaphylaxis    Pollen Extract Shortness Of Breath and Allergic Rhinitis    Other      Annotation - [id number]: petrolium derivatives, and perfume    Chlorzoxazone Rash    Codeine Itching and Rash       Objective     Blood pressure 140/92, pulse 98, temperature (!) 96 9 °F (36 1 °C), temperature source Tympanic, resp   rate 18, height 5' 7" (1 702 m), weight 79 4 kg (175 lb)  [unfilled]    PHYSICAL EXAM     GEN: well nourished, well developed, no acute distress  HEENT: anicteric, MMM, no cervical or supraclavicular lymphadenopathy  CV: RRR, no m/r/g  CHEST: CTA b/l, no WRR  ABD: +BS, soft, NT/ND, no hepatosplenomegaly  EXT: no c/c/e  SKIN: no rashes,  NEURO: aaox3    Lab Results:   No visits with results within 1 Day(s) from this visit     Latest known visit with results is:   Appointment on 04/26/2018   Component Date Value    WBC 04/26/2018 4 18*    RBC 04/26/2018 5 27     Hemoglobin 04/26/2018 15 6     Hematocrit 04/26/2018 45 9     MCV 04/26/2018 87     MCH 04/26/2018 29 6     MCHC 04/26/2018 34 0     RDW 04/26/2018 12 1     MPV 04/26/2018 9 9     Platelets 24/17/5212 161     Neutrophils Relative 04/26/2018 51     Lymphocytes Relative 04/26/2018 34     Monocytes Relative 04/26/2018 14*    Eosinophils Relative 04/26/2018 0     Basophils Relative 04/26/2018 1     Neutrophils Absolute 04/26/2018 2 14     Lymphocytes Absolute 04/26/2018 1 40     Monocytes Absolute 04/26/2018 0 60     Eosinophils Absolute 04/26/2018 0 01     Basophils Absolute 04/26/2018 0 03     Sodium 04/26/2018 138     Potassium 04/26/2018 3 8     Chloride 04/26/2018 101     CO2 04/26/2018 30     Anion Gap 04/26/2018 7     BUN 04/26/2018 15     Creatinine 04/26/2018 0 89     Glucose, Fasting 04/26/2018 94     Calcium 04/26/2018 8 4     AST 04/26/2018 26     ALT 04/26/2018 47     Alkaline Phosphatase 04/26/2018 51     Total Protein 04/26/2018 6 9     Albumin 04/26/2018 3 8     Total Bilirubin 04/26/2018 0 60     eGFR 04/26/2018 87     Cholesterol 04/26/2018 166     Triglycerides 04/26/2018 165*    HDL, Direct 04/26/2018 51     LDL Calculated 04/26/2018 82     Non-HDL-Chol (CHOL-HDL) 04/26/2018 115     PSA 04/26/2018 0 8     Bilirubin, Direct 04/26/2018 0 14      Imaging Studies: I have personally reviewed pertinent films in PACS

## 2018-07-30 NOTE — LETTER
July 30, 2018     Radha Green MD  [address]    Patient: Jerome Ruiz   YOB: 1950   Date of Visit: 7/30/2018       Dear Dr Loan Carter: Thank you for referring Sebastien Sanchez to me for evaluation  Below are my notes for this consultation  If you have questions, please do not hesitate to call me  I look forward to following your patient along with you  Sincerely,        Manny Snow MD        CC: No Recipients  Manny Snow MD  7/30/2018  9:05 AM  Sign at close encounter  Consultation - [facility]  Haylie Centeno 69 y o  male MRN: [id number]  Unit/Bed#:  Encounter: [id number]        Consults    ASSESSMENT/PLAN:   1  Dyspepsia-longstanding symptoms, prior history of esophagitis and H pylori, on omeprazole 20 mg with breakthrough symptoms   - Patient was explained about the lifestyle and dietary modifications  Advised to avoid fatty foods, chocolates, caffeine, alcohol and any other triggering foods  Avoid eating for at least 3 hours before going to bed   -recent blood work shows normal hemoglobin, normal liver functions  -will plan for EGD to assess for Quinn's and also to assess for eradication of H pylori  2 ?  History of hepatitis A-liver functions are normal, will obtain chronic hepatitis panel  3   Personal history of polyps and family history of colon cancer in brother and father-previous colonoscopy in 2017 without polyps, moderate diverticulosis in the sigmoid along with internal and external hemorrhoids  Continue high-fiber diet  Plan to repeat colonoscopy in 2021             ______________________________________________________________________    Reason for Consult / Principal Problem: [unfilled]    HPI: Jerome Ruiz is a 69y o  year old male with history of acid reflux, H pylori, polyps, asthma, presents for evaluation of left upper quadrant abdominal pain along with bloating  Patient states that symptoms are often postprandial   He has been on omeprazole 20 mg for least 7-8 years with good control of acid reflux symptoms, states that he very rarely gets breakthrough heartburn symptoms  Denies unintentional weight loss or NSAID use  He denies hematemesis, melena or change in bowel habits  He underwent colonoscopy last year and was noted to have moderate sigmoid diverticulosis  He does have family history of colon cancer in brother and father  Review of Systems: The remainder of the review of systems was negative except for the pertinent positives noted in HPI  Historical Information   Past Medical History:   Diagnosis Date    Asthma     Benign prostatic hyperplasia     Blood transfusion reaction     Bronchitis 04/30/2018    Cardiac arrest ([facility] )     oct 2000    Diverticulosis     GERD (gastroesophageal reflux disease)     Hepatitis A     History of colonic polyps     Internal bleeding     2000    Migraine     Osteoarthritis     Seizures ([facility] )     Stroke ([place] [address] )     2001, 2003     Past Surgical History:   Procedure Laterality Date    APPENDECTOMY      DENTAL SURGERY      wisdom tooth extraction    HEMORRHOID SURGERY      MYRINGOTOMY W/ TUBES      [place] COLONOSCOPY FLX DX W/COLLJ SPEC WHEN PFRMD N/A 7/7/2017    Procedure: COLONOSCOPY;  Surgeon: Tamiko Saldana MD;  [facility];   Service: Colorectal    PROSTATE SURGERY      TONSILLECTOMY      VASCULAR SURGERY      VASECTOMY       Social History   History   Alcohol Use No     History   Drug Use No     History   Smoking Status    Never Smoker   Smokeless Tobacco    Never Used     Comment: never a smoker as per [employer]     Family History   Problem Relation Age of Onset    Cancer Father         colon    Diabetes Father     Cancer Brother     Diabetes Mother     Lupus Sister         systemic-erythematosus    Diabetes Family     Kidney cancer Family        Meds/Allergies       (Not in a hospital admission)  No current facility-administered medications for this visit  Allergies   Allergen Reactions    Dust Mite Extract Shortness Of Breath and Allergic Rhinitis    Iodinated Diagnostic Agents Anaphylaxis    Pollen Extract Shortness Of Breath and Allergic Rhinitis    Other      Annotation - [id number]: petrolium derivatives, and perfume    Chlorzoxazone Rash    Codeine Itching and Rash       Objective     Blood pressure 140/92, pulse 98, temperature (!) 96 9 °F (36 1 °C), temperature source Tympanic, resp  rate 18, height 5' 7" (1 702 m), weight 79 4 kg (175 lb)  [unfilled]    PHYSICAL EXAM     GEN: well nourished, well developed, no acute distress  HEENT: anicteric, MMM, no cervical or supraclavicular lymphadenopathy  CV: RRR, no m/r/g  CHEST: CTA b/l, no WRR  ABD: +BS, soft, NT/ND, no hepatosplenomegaly  EXT: no c/c/e  SKIN: no rashes,  NEURO: aaox3    Lab Results:   No visits with results within 1 Day(s) from this visit     Latest known visit with results is:   Appointment on 04/26/2018   Component Date Value    WBC 04/26/2018 4 18*    RBC 04/26/2018 5 27     Hemoglobin 04/26/2018 15 6     Hematocrit 04/26/2018 45 9     MCV 04/26/2018 87     MCH 04/26/2018 29 6     MCHC 04/26/2018 34 0     RDW 04/26/2018 12 1     MPV 04/26/2018 9 9     Platelets 66/36/0962 161     Neutrophils Relative 04/26/2018 51     Lymphocytes Relative 04/26/2018 34     Monocytes Relative 04/26/2018 14*    Eosinophils Relative 04/26/2018 0     Basophils Relative 04/26/2018 1     Neutrophils Absolute 04/26/2018 2 14     Lymphocytes Absolute 04/26/2018 1 40     Monocytes Absolute 04/26/2018 0 60     Eosinophils Absolute 04/26/2018 0 01     Basophils Absolute 04/26/2018 0 03     Sodium 04/26/2018 138     Potassium 04/26/2018 3 8     Chloride 04/26/2018 101     CO2 04/26/2018 30     Anion Gap 04/26/2018 7     BUN 04/26/2018 15     Creatinine 04/26/2018 0 89     Glucose, Fasting 04/26/2018 94     Calcium 04/26/2018 8 4     AST 04/26/2018 26     ALT 04/26/2018 47     Alkaline Phosphatase 04/26/2018 51     Total Protein 04/26/2018 6 9     Albumin 04/26/2018 3 8     Total Bilirubin 04/26/2018 0 60     eGFR 04/26/2018 87     Cholesterol 04/26/2018 166     Triglycerides 04/26/2018 165*    HDL, Direct 04/26/2018 51     LDL Calculated 04/26/2018 82     Non-HDL-Chol (CHOL-HDL) 04/26/2018 115     PSA 04/26/2018 0 8     Bilirubin, Direct 04/26/2018 0 14      Imaging Studies: I have personally reviewed pertinent films in PACS

## 2018-07-30 NOTE — ASSESSMENT & PLAN NOTE
Will order MRI brain wo contrast for further evaluation  Also start Nortriptyline 25 mg daily for migraine prophylaxis  Continue with Fioricet as needed for breakthrough headaches  Will also give Zofran for N/V  F/u 1-week

## 2018-07-30 NOTE — PROGRESS NOTES
Assessment/Plan:    Migraine without aura and without status migrainosus, not intractable  Will order MRI brain wo contrast for further evaluation  Also start Nortriptyline 25 mg daily for migraine prophylaxis  Continue with Fioricet as needed for breakthrough headaches  Will also give Zofran for N/V  F/u 1-week  Elevated BP without diagnosis of hypertension  Likely related to acute headache  Will defer on starting antihypertensives at this time  F/u in 1-week  Diagnoses and all orders for this visit:    Intractable vomiting with nausea, unspecified vomiting type  -     ondansetron (ZOFRAN) injection 4 mg; Infuse 2 mL (4 mg total) into a venous catheter once   -     ondansetron (ZOFRAN-ODT) 4 mg disintegrating tablet; Take 1 tablet (4 mg total) by mouth every 6 (six) hours as needed for nausea or vomiting  -     MRI brain wo contrast; Future    Muscle tension headache    Migraine without aura and without status migrainosus, not intractable  -     ondansetron (ZOFRAN-ODT) 4 mg disintegrating tablet; Take 1 tablet (4 mg total) by mouth every 6 (six) hours as needed for nausea or vomiting  -     nortriptyline (PAMELOR) 25 mg capsule; Take 1 capsule (25 mg total) by mouth Medrol Dose Pack scheduling ONLY for 1 dose  -     MRI brain wo contrast; Future    Elevated BP without diagnosis of hypertension    Other orders  -     Potassium 95 MG TABS; Take 1 tablet by mouth daily  -     Fenoprofen Calcium 400 MG CAPS; Take 1 capsule by mouth daily        Time spent during encounter: 25 minutes  Subjective:      Patient ID: Mick Isidro is a 69 y o  male  69year old male is seen today for follow up of chronic headaches  Headaches have not subsided and occur twice daily  He was prescribed Fioricet and Skelaxin for headaches with no improvement  He was never on migraine prophylaxis, previously on sumatriptan however was discontinued due to lack of efficiency   He presents this morning with worsening headache and nausea, vomited today in the office  He was given an IM injection of zofran  BP is elevated today  Headache    This is a chronic problem  The current episode started more than 1 month ago  The problem occurs daily  The problem has been unchanged  The pain is located in the bilateral region  The pain does not radiate  The pain quality is similar to prior headaches  The quality of the pain is described as band-like and throbbing  The pain is moderate  Associated symptoms include blurred vision, dizziness, a loss of balance, nausea, photophobia, a visual change and vomiting  Pertinent negatives include no abdominal pain, abnormal behavior, anorexia, back pain, coughing, ear pain, eye pain, eye redness, eye watering, facial sweating, fever, hearing loss, insomnia, muscle aches, numbness, phonophobia, rhinorrhea, scalp tenderness, seizures, sinus pressure, sore throat, swollen glands, tingling, tinnitus, weakness or weight loss  Treatments tried: skelaxin, fioricet  The treatment provided mild relief  Abdominal Pain   Associated symptoms include headaches, nausea and vomiting  Pertinent negatives include no anorexia, constipation, diarrhea, dysuria, fever, hematuria or weight loss  The following portions of the patient's history were reviewed and updated as appropriate: allergies, current medications, past family history, past medical history, past social history, past surgical history and problem list     Review of Systems   Constitutional: Negative for activity change, appetite change, chills, diaphoresis, fatigue, fever and weight loss  HENT: Negative for congestion, ear pain, hearing loss, postnasal drip, rhinorrhea, sinus pain, sinus pressure, sneezing, sore throat and tinnitus  Eyes: Positive for blurred vision and photophobia  Negative for pain, redness and visual disturbance  Respiratory: Negative for apnea, cough, choking, chest tightness, shortness of breath and wheezing  Cardiovascular: Negative for chest pain, palpitations and leg swelling  Gastrointestinal: Positive for nausea and vomiting  Negative for abdominal distention, abdominal pain, anal bleeding, anorexia, blood in stool, constipation and diarrhea  Endocrine: Negative for cold intolerance and heat intolerance  Genitourinary: Negative for difficulty urinating, dysuria and hematuria  Musculoskeletal: Negative  Negative for back pain  Skin: Negative  Neurological: Positive for dizziness, headaches and loss of balance  Negative for tingling, seizures, weakness, light-headedness and numbness  Hematological: Negative for adenopathy  Psychiatric/Behavioral: Negative for agitation, sleep disturbance and suicidal ideas  The patient does not have insomnia  All other systems reviewed and are negative          Past Medical History:   Diagnosis Date    Asthma     Benign prostatic hyperplasia     Blood transfusion reaction     Bronchitis 04/30/2018    Cardiac arrest ([facility] )     oct 2000    Diverticulosis     GERD (gastroesophageal reflux disease)     Hepatitis A     History of colonic polyps     Internal bleeding     2000    Migraine     Osteoarthritis     Seizures ([facility] )     Stroke ([place] [address] )     2001, 2003         Current Outpatient Prescriptions:     ALPRAZolam (XANAX) 1 mg tablet, Take 1 tablet (1 mg total) by mouth daily at bedtime as needed for sleep, Disp: 90 tablet, Rfl: 0    aspirin 81 MG tablet, Take 81 mg by mouth daily  , Disp: , Rfl:     aspirin-acetaminophen-caffeine (EXCEDRIN MIGRAINE) 250-250-65 MG per tablet, Take 1 tablet by mouth every 6 (six) hours as needed for headaches  , Disp: , Rfl:     b complex vitamins capsule, Take 1 capsule by mouth daily, Disp: , Rfl:     butalbital-acetaminophen-caffeine (FIORICET,ESGIC) -40 mg per tablet, Take 1 tablet by mouth every 4 (four) hours as needed for headaches, Disp: 30 tablet, Rfl: 0    Fenoprofen Calcium 400 MG CAPS, Take 1 capsule by mouth daily, Disp: , Rfl:     fluticasone (FLOVENT HFA) 220 mcg/act inhaler, Inhale 1 puff 2 (two) times a day, Disp: 3 Inhaler, Rfl: 3    metaxalone (SKELAXIN) 400 MG tablet, Take 1 tablet (400 mg total) by mouth 4 (four) times a day, Disp: 120 tablet, Rfl: 1    Multiple Vitamins-Minerals (MULTIVITAMIN WITH MINERALS) tablet, Take 1 tablet by mouth daily, Disp: , Rfl:     omeprazole (PriLOSEC) 20 mg delayed release capsule, Take 20 mg by mouth daily  , Disp: , Rfl:     Potassium 95 MG TABS, Take 1 tablet by mouth daily, Disp: , Rfl:     nortriptyline (PAMELOR) 25 mg capsule, Take 1 capsule (25 mg total) by mouth Medrol Dose Pack scheduling ONLY for 1 dose, Disp: 30 capsule, Rfl: 0    ondansetron (ZOFRAN-ODT) 4 mg disintegrating tablet, Take 1 tablet (4 mg total) by mouth every 6 (six) hours as needed for nausea or vomiting, Disp: 30 tablet, Rfl: 0    Current Facility-Administered Medications:     ondansetron (ZOFRAN) injection 4 mg, 4 mg, Intravenous, Once, Donald Anne MD    Allergies   Allergen Reactions    Dust Mite Extract Shortness Of Breath and Allergic Rhinitis    Iodinated Diagnostic Agents Anaphylaxis    Pollen Extract Shortness Of Breath and Allergic Rhinitis    Other      Annotation - [id number]: petrolium derivatives, and perfume    Chlorzoxazone Rash    Codeine Itching and Rash       Social History   Past Surgical History:   Procedure Laterality Date    APPENDECTOMY      DENTAL SURGERY      wisdom tooth extraction    HEMORRHOID SURGERY      MYRINGOTOMY W/ TUBES      [place] COLONOSCOPY FLX DX W/COLLJ SPEC WHEN PFRMD N/A 7/7/2017    Procedure: COLONOSCOPY;  Surgeon: Yarelis Mitchell MD;  [facility];   Service: Colorectal    PROSTATE SURGERY      TONSILLECTOMY      VASCULAR SURGERY      VASECTOMY       Family History   Problem Relation Age of Onset    Cancer Father         colon    Diabetes Father     Cancer Brother         colo-rectal, liver    Diabetes Mother     Lupus Sister         systemic-erythematosus    Diabetes Family     Kidney disease Family        Objective:  /100 (BP Location: Left arm, Patient Position: Supine, Cuff Size: Adult)   Pulse 82   Temp 97 9 °F (36 6 °C) (Tympanic)   SpO2 97% Comment: room air    No results found for this or any previous visit (from the past 1344 hour(s))  Physical Exam   Constitutional: He is oriented to person, place, and time  He appears well-developed and well-nourished  No distress  HENT:   Head: Normocephalic and atraumatic  Eyes: Conjunctivae and EOM are normal  Pupils are equal, round, and reactive to light  Right eye exhibits no discharge  Left eye exhibits no discharge  No scleral icterus  Neck: Normal range of motion  Neck supple  No JVD present  No thyromegaly present  Cardiovascular: Normal rate, regular rhythm, normal heart sounds and intact distal pulses  Exam reveals no gallop and no friction rub  No murmur heard  Pulmonary/Chest: Effort normal and breath sounds normal  No respiratory distress  He has no wheezes  He has no rales  He exhibits no tenderness  Abdominal: Soft  Bowel sounds are normal  He exhibits no distension and no mass  There is no tenderness  There is no rebound and no guarding  Musculoskeletal: Normal range of motion  He exhibits no edema, tenderness or deformity  Lymphadenopathy:     He has no cervical adenopathy  Neurological: He is alert and oriented to person, place, and time  He has normal reflexes  No cranial nerve deficit  Coordination normal    Skin: Skin is warm and dry  No rash noted  He is not diaphoretic  No erythema  No pallor  Psychiatric: He has a normal mood and affect  His behavior is normal  Judgment and thought content normal    Nursing note and vitals reviewed

## 2018-07-30 NOTE — ASSESSMENT & PLAN NOTE
Likely related to acute headache  Will defer on starting antihypertensives at this time  F/u in 1-week

## 2018-07-31 LAB
HBV CORE AB SER QL: NORMAL
HBV CORE IGM SER QL: NORMAL
HBV SURFACE AG SER QL: NORMAL
HCV AB SER QL: NORMAL

## 2018-07-31 NOTE — PROGRESS NOTES
8/2/2018    Liliya Centeno  1950  [id number]    Discussion and Plan    Patient continues to do well from a urologic standpoint with no significant urinary complaints  He therefore will follow up in 1 year with a urinalysis and PSA prior to visit  All questions answered at this time  1  Nephrolithiasis    2  Benign prostatic hyperplasia with lower urinary tract symptoms, symptom details unspecified  - Urinalysis with microscopic; Future  - PSA Total, Diagnostic; Future    Assessment      Patient Active Problem List   Diagnosis    GERD without esophagitis    Migraine without aura and without status migrainosus, not intractable    Seasonal allergic rhinitis due to pollen    Cough    Muscle tension headache    Persistent asthma    Insomnia    Nephrolithiasis    Gastroesophageal reflux disease    Elevated BP without diagnosis of hypertension    Benign prostatic hyperplasia with lower urinary tract symptoms       History of Present Illness    Reagan Hearn is a 69 y o  male seen today in regards to a history of mild BPH and micro hematuria  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 for which he had undergone a suprapubic prostatectomy in the early 1990s  He denies any urinary complaints since then reporting otherwise good flow with complete bladder emptying sensation  No nocturia  No gross hematuria urinary tract infection  Did have a history of nephrolithiasis remotely  CT scan 2 years ago demonstrated a 1 mm intrarenal calculus  Most recent PSA is 0 8      Urinary Symptom Assessment        Past Medical History  Past Medical History:   Diagnosis Date    Asthma     Benign prostatic hyperplasia     Blood transfusion reaction     Bronchitis 04/30/2018    Cardiac arrest ([facility] [address] )     oct 2000    Diverticulosis     GERD (gastroesophageal reflux disease)     Hepatitis A     History of colonic polyps     Internal bleeding     2000    Migraine     Osteoarthritis     Seizures [facility])     Stroke ([facility] [address] )     2001, 2003       Past Social History  Past Surgical History:   Procedure Laterality Date    APPENDECTOMY      DENTAL SURGERY      wisdom tooth extraction    HEMORRHOID SURGERY      MYRINGOTOMY W/ TUBES      RI COLONOSCOPY FLX DX W/COLLJ SPEC WHEN PFRMD N/A 7/7/2017    Procedure: COLONOSCOPY;  Surgeon: Tamiko Saldana MD;  [facility]; Service: Colorectal    PROSTATE SURGERY      TONSILLECTOMY      VASECTOMY         Past Family History  Family History   Problem Relation Age of Onset    Cancer Father         colon    Diabetes Father     Cancer Brother         colo-rectal, liver    Diabetes Mother     Lupus Sister         systemic-erythematosus    Diabetes Family     Kidney disease Family        Past Social history  Social History     Social History    Marital status:      Spouse name: N/A    Number of children: N/A    Years of education: N/A     Occupational History    Not on file       Social History Main Topics    Smoking status: Never Smoker    Smokeless tobacco: Never Used      Comment: never a smoker as per [employer]    Alcohol use No    Drug use: No    Sexual activity: Not on file     Other Topics Concern    Not on file     Social History Narrative    No narrative on file       Current Medications  Current Outpatient Prescriptions   Medication Sig Dispense Refill    ALPRAZolam (XANAX) 1 mg tablet Take 1 tablet (1 mg total) by mouth daily at bedtime as needed for sleep 90 tablet 0    aspirin 81 MG tablet Take 81 mg by mouth daily        aspirin-acetaminophen-caffeine (EXCEDRIN MIGRAINE) 250-250-65 MG per tablet Take 1 tablet by mouth every 6 (six) hours as needed for headaches        b complex vitamins capsule Take 1 capsule by mouth daily      butalbital-acetaminophen-caffeine (FIORICET,ESGIC) -40 mg per tablet Take 1 tablet by mouth every 4 (four) hours as needed for headaches 30 tablet 0    Fenoprofen Calcium 400 MG CAPS Take 1 capsule by mouth daily      fluticasone (FLOVENT HFA) 220 mcg/act inhaler Inhale 1 puff 2 (two) times a day 3 Inhaler 3    metaxalone (SKELAXIN) 400 MG tablet Take 1 tablet (400 mg total) by mouth 4 (four) times a day 120 tablet 1    Multiple Vitamins-Minerals (MULTIVITAMIN WITH MINERALS) tablet Take 1 tablet by mouth daily      ondansetron (ZOFRAN-ODT) 4 mg disintegrating tablet Take 1 tablet (4 mg total) by mouth every 6 (six) hours as needed for nausea or vomiting 30 tablet 0    pantoprazole (PROTONIX) 40 mg tablet Take 1 tablet (40 mg total) by mouth daily for 90 days 90 tablet 3    Potassium 95 MG TABS Take 1 tablet by mouth daily      nortriptyline (PAMELOR) 25 mg capsule Take 1 capsule (25 mg total) by mouth Medrol Dose Pack scheduling ONLY for 1 dose 30 capsule 0     No current facility-administered medications for this visit  Allergies  Allergies   Allergen Reactions    Dust Mite Extract Shortness Of Breath and Allergic Rhinitis    Iodinated Diagnostic Agents Anaphylaxis    Pollen Extract Shortness Of Breath and Allergic Rhinitis    Other      Annotation - [id number]: petrolium derivatives, and perfume    Chlorzoxazone Rash    Codeine Itching and Rash       Past Medical History, Social History, Family History, medications and allergies were reviewed  Review of Systems  Review of Systems   Constitutional: Negative  HENT: Negative  Eyes: Negative  Respiratory: Negative  Cardiovascular: Negative  Gastrointestinal: Negative  Endocrine: Negative  Genitourinary: Negative for decreased urine volume, difficulty urinating, hematuria and urgency  Musculoskeletal: Negative  Skin: Negative  Neurological: Negative  Hematological: Negative  Psychiatric/Behavioral: Negative          Vitals  Vitals:    08/02/18 1342   BP: 146/88   BP Location: Left arm   Patient Position: Sitting   Cuff Size: Adult   Pulse: 80   Weight: 82 1 kg (181 lb)   Height: 5' 7" (1 702 m) Physical Exam    Physical Exam   Constitutional: He is oriented to person, place, and time  He appears well-developed and well-nourished  HENT:   Head: Normocephalic and atraumatic  Eyes: Pupils are equal, round, and reactive to light  Neck: Normal range of motion  Cardiovascular: Normal rate, regular rhythm and normal heart sounds  Pulmonary/Chest: Effort normal and breath sounds normal  No accessory muscle usage  No respiratory distress  Abdominal: Soft  Normal appearance and bowel sounds are normal  There is no tenderness  Genitourinary: Rectum normal, prostate normal and penis normal  No penile tenderness  Genitourinary Comments: Prostate is a approximately 35 g consistent with prior surgery in the region however no nodule detected  Musculoskeletal: Normal range of motion  Neurological: He is alert and oriented to person, place, and time  Skin: Skin is warm, dry and intact  Psychiatric: He has a normal mood and affect  His speech is normal  Cognition and memory are normal    Nursing note and vitals reviewed  Results    Below listed labs, pathology results, and radiology images were personally reviewed:    Lab Results   Component Value Date/Time    PSA 0 8 04/26/2018 06:10 AM     Lab Results   Component Value Date    GLUCOSE 112 02/24/2016    CALCIUM 8 4 04/26/2018     04/26/2018    K 3 8 04/26/2018    CO2 30 04/26/2018     04/26/2018    BUN 15 04/26/2018    CREATININE 0 89 04/26/2018     Lab Results   Component Value Date    WBC 4 18 (L) 04/26/2018    HGB 15 6 04/26/2018    HCT 45 9 04/26/2018    MCV 87 04/26/2018     04/26/2018       No results found for this or any previous visit (from the past 1 hour(s)) ]    CT ABDOMEN AND PELVIS WITHOUT IV CONTRAST - LOW DOSE RENAL STONE      INDICATION: Left flank pain    History of stones      COMPARISON: None      TECHNIQUE:  Low dose thin section CT examination of the abdomen and pelvis was performed without intravenous or oral contrast according to a protocol specifically designed to evaluate for urinary tract calculus  Axial, sagittal and coronal reformatted   images were submitted for interpretation  This examination, like all CT scans performed in the [facility], was performed utilizing techniques to minimize radiation dose exposure, including the use of iterative reconstruction and   automated exposure control  Evaluation for pathology in the abdomen and pelvis that is unrelated to urinary tract calculi is limited        FINDINGS:     RIGHT KIDNEY AND URETER:  1 mm nonobstructing mid renal calculus  No ureterolithiasis  No hydronephrosis or hydroureter  No perinephric collection      LEFT KIDNEY AND URETER:  No urinary tract calculi  No hydronephrosis or hydroureter  No perinephric collection      URINARY BLADDER:  Unremarkable      No significant abnormality in the visualized lung bases  Diffuse marked decreased attenuation throughout the liver most consistent with the appearance of steatosis  Focal sparing adjacent to the gallbladder fossa  Limited low radiation dose noncontrast CT evaluation demonstrates no clinically significant abnormality of spleen, pancreas, or adrenal glands  No calcified gallstones or gallbladder wall thickening noted  No bowel obstruction  No ascites or lymphadenopathy  Limited evaluation demonstrates no evidence to suggest acute appendicitis  No acute fracture or destructive osseous lesion is identified  Diverticulosis without evidence for diverticulitis      IMPRESSION:     No acute abnormality identified  Nonobstructing right renal calculus  No left nephroureterolithiasis      Diverticulosis without evidence for diverticulitis      Hepatic steatosis                Workstation performed: [id number]      Imaging     CT abdomen pelvis without contrast (renal stone protocol) (Order #[id number]) on 2/24/2016 - Imaging Information

## 2018-08-01 ENCOUNTER — HOSPITAL ENCOUNTER (OUTPATIENT)
Dept: RADIOLOGY | Age: 68
Discharge: HOME | End: 2018-08-01
Payer: MEDICARE

## 2018-08-01 DIAGNOSIS — R11.2 INTRACTABLE VOMITING WITH NAUSEA, UNSPECIFIED VOMITING TYPE: ICD-10-CM

## 2018-08-01 DIAGNOSIS — G43.009 MIGRAINE WITHOUT AURA AND WITHOUT STATUS MIGRAINOSUS, NOT INTRACTABLE: ICD-10-CM

## 2018-08-01 LAB
LEFT EYE DIABETIC RETINOPATHY: NORMAL
RIGHT EYE DIABETIC RETINOPATHY: NORMAL

## 2018-08-01 PROCEDURE — 70551 MRI BRAIN STEM W/O DYE: CPT

## 2018-08-02 ENCOUNTER — HOSPITAL ENCOUNTER (OUTPATIENT)
Facility: HOSPITAL | Age: 68
Setting detail: OUTPATIENT SURGERY
Discharge: HOME | End: 2018-08-02
Attending: INTERNAL MEDICINE | Admitting: INTERNAL MEDICINE
Payer: MEDICARE

## 2018-08-02 ENCOUNTER — ANESTHESIA (OUTPATIENT)
Dept: GASTROENTEROLOGY | Facility: HOSPITAL | Age: 68
End: 2018-08-02
Payer: MEDICARE

## 2018-08-02 ENCOUNTER — OFFICE VISIT (OUTPATIENT)
Dept: UROLOGY | Facility: AMBULATORY SURGERY CENTER | Age: 68
End: 2018-08-02
Payer: MEDICARE

## 2018-08-02 ENCOUNTER — ANESTHESIA EVENT (OUTPATIENT)
Dept: GASTROENTEROLOGY | Facility: HOSPITAL | Age: 68
End: 2018-08-02
Payer: MEDICARE

## 2018-08-02 VITALS
WEIGHT: 181 LBS | HEART RATE: 80 BPM | SYSTOLIC BLOOD PRESSURE: 146 MMHG | DIASTOLIC BLOOD PRESSURE: 88 MMHG | BODY MASS INDEX: 28.41 KG/M2 | HEIGHT: 67 IN

## 2018-08-02 VITALS
OXYGEN SATURATION: 93 % | HEART RATE: 67 BPM | HEIGHT: 67 IN | RESPIRATION RATE: 16 BRPM | SYSTOLIC BLOOD PRESSURE: 128 MMHG | WEIGHT: 181.38 LBS | BODY MASS INDEX: 28.47 KG/M2 | DIASTOLIC BLOOD PRESSURE: 85 MMHG | TEMPERATURE: 98 F

## 2018-08-02 DIAGNOSIS — N40.1 BENIGN PROSTATIC HYPERPLASIA WITH LOWER URINARY TRACT SYMPTOMS, SYMPTOM DETAILS UNSPECIFIED: ICD-10-CM

## 2018-08-02 DIAGNOSIS — K21.9 GASTROESOPHAGEAL REFLUX DISEASE, ESOPHAGITIS PRESENCE NOT SPECIFIED: ICD-10-CM

## 2018-08-02 DIAGNOSIS — K21.9 GERD WITHOUT ESOPHAGITIS: Primary | ICD-10-CM

## 2018-08-02 DIAGNOSIS — N20.0 NEPHROLITHIASIS: Primary | ICD-10-CM

## 2018-08-02 DIAGNOSIS — G43.009 MIGRAINE WITHOUT AURA AND WITHOUT STATUS MIGRAINOSUS, NOT INTRACTABLE: ICD-10-CM

## 2018-08-02 PROCEDURE — 88342 IMHCHEM/IMCYTCHM 1ST ANTB: CPT | Performed by: PATHOLOGY

## 2018-08-02 PROCEDURE — 88305 TISSUE EXAM BY PATHOLOGIST: CPT | Performed by: PATHOLOGY

## 2018-08-02 PROCEDURE — 99203 OFFICE O/P NEW LOW 30 MIN: CPT | Performed by: UROLOGY

## 2018-08-02 PROCEDURE — 43239 EGD BIOPSY SINGLE/MULTIPLE: CPT | Performed by: INTERNAL MEDICINE

## 2018-08-02 RX ORDER — LIDOCAINE HYDROCHLORIDE 10 MG/ML
INJECTION, SOLUTION INFILTRATION; PERINEURAL AS NEEDED
Status: DISCONTINUED | OUTPATIENT
Start: 2018-08-02 | End: 2018-08-02 | Stop reason: SURG

## 2018-08-02 RX ORDER — NORTRIPTYLINE HYDROCHLORIDE 25 MG/1
25 CAPSULE ORAL
Qty: 30 CAPSULE | Refills: 0 | Status: SHIPPED | OUTPATIENT
Start: 2018-08-02 | End: 2018-08-03 | Stop reason: ALTCHOICE

## 2018-08-02 RX ORDER — PANTOPRAZOLE SODIUM 40 MG/1
40 TABLET, DELAYED RELEASE ORAL DAILY
Qty: 90 TABLET | Refills: 3 | Status: SHIPPED | OUTPATIENT
Start: 2018-08-02 | End: 2018-11-28 | Stop reason: SDUPTHER

## 2018-08-02 RX ORDER — PROPOFOL 10 MG/ML
INJECTION, EMULSION INTRAVENOUS AS NEEDED
Status: DISCONTINUED | OUTPATIENT
Start: 2018-08-02 | End: 2018-08-02 | Stop reason: SURG

## 2018-08-02 RX ORDER — SODIUM CHLORIDE 9 MG/ML
INJECTION, SOLUTION INTRAVENOUS CONTINUOUS PRN
Status: DISCONTINUED | OUTPATIENT
Start: 2018-08-02 | End: 2018-08-02 | Stop reason: SURG

## 2018-08-02 RX ADMIN — PROPOFOL 100 MG: 10 INJECTION, EMULSION INTRAVENOUS at 09:06

## 2018-08-02 RX ADMIN — PROPOFOL 50 MG: 10 INJECTION, EMULSION INTRAVENOUS at 09:07

## 2018-08-02 RX ADMIN — LIDOCAINE HYDROCHLORIDE 30 MG: 10 INJECTION, SOLUTION INFILTRATION; PERINEURAL at 09:06

## 2018-08-02 RX ADMIN — SODIUM CHLORIDE: 0.9 INJECTION, SOLUTION INTRAVENOUS at 07:55

## 2018-08-02 RX ADMIN — PROPOFOL 50 MG: 10 INJECTION, EMULSION INTRAVENOUS at 09:10

## 2018-08-02 NOTE — OP NOTE
ESOPHAGOGASTRODUODENOSCOPY    PROCEDURE: EGD    SEDATION: Monitored anesthesia care, check anesthesia records    ASA Class: 2    INDICATIONS: GERD  Hx of H pylori  CONSENT:  Informed consent was obtained for the procedure, including sedation after explaining the risks and benefits of the procedure  Risks including but not limited to bleeding, perforation, infection, and missed lesion  PREPARATION:   Telemetry, pulse oximetry, blood pressure were monitored throughout the procedure  Patient was identified by myself both verbally and by visual inspection of ID band  DESCRIPTION:   Patient was placed in the left lateral decubitus position and was sedated with the above medication  The gastroscope was introduced in to the oropharynx and the esophagus was intubated under direct visualization  Scope was passed down the esophagus up to 2nd part of the duodenum  A careful inspection was made as the gastroscope was withdrawn, including a retroflexed view of the stomach; findings and interventions are described below  FINDINGS:    #1  Esophagus-  Normal appearing squamocolumnar junction noted at 38 cm  There is a 3 cm hiatal hernia starting from 37-40 cm  #2  Stomach-   Patchy erythema involving the fundus, lesser curvature and antrum suggestive of moderate gastritis  Numerous biopsies were obtained from the body and antrum to assess for H pylori  Retroflexed view revealed sliding hiatal hernia  #3  Duodenum-   Duodenal mucosa appeared normal within the bulb, duodenal sweep and D2  Biopsies were obtained for celiac disease  IMPRESSIONS:      1  Sliding hiatal hernia  2   Moderate gastritis  RECOMMENDATIONS:     1  Continue PPI on daily basis  2   Eradicating H pylori if positive  3   Avoid the use of NSAIDs  4   Avoid fatty foods, chocolates, caffeine, alcohol and any other triggering foods  Avoid eating for at least 3 hours before going to bed    5   Follow up biopsy results in 2-3 weeks     COMPLICATIONS:  None; patient tolerated the procedure well            DISPOSITION: PACU           CONDITION: Stable

## 2018-08-02 NOTE — ANESTHESIA PREPROCEDURE EVALUATION
Review of Systems/Medical History  Patient summary reviewed  Chart reviewed  No history of anesthetic complications     Cardiovascular  Negative cardio ROS    Pulmonary  Asthma , well controlled/ stable ,        GI/Hepatic    GI bleeding , history of, GERD , Liver disease , Hepatitis A,        Kidney stones,        Endo/Other  Negative endo/other ROS      GYN       Hematology  Negative hematology ROS      Musculoskeletal    Arthritis     Neurology  Seizures well controlled,  CVA , no residual symptoms, Headaches,    Psychology   Negative psychology ROS              Physical Exam    Airway    Mallampati score: II  TM Distance: >3 FB  Neck ROM: full     Dental   No notable dental hx     Cardiovascular  Comment: Negative ROS, Rhythm: regular, Rate: normal, Cardiovascular exam normal    Pulmonary  Pulmonary exam normal Breath sounds clear to auscultation,     Other Findings        Anesthesia Plan  ASA Score- 3     Anesthesia Type- IV sedation with anesthesia with ASA Monitors  Additional Monitors:   Airway Plan:         Plan Factors-    Induction- intravenous  Postoperative Plan-     Informed Consent- Anesthetic plan and risks discussed with patient  I personally reviewed this patient with the CRNA  Discussed and agreed on the Anesthesia Plan with the CRNA  Inga Colon

## 2018-08-02 NOTE — ANESTHESIA POSTPROCEDURE EVALUATION
Post-Op Assessment Note      CV Status:  Stable    Mental Status:  Alert and awake    Hydration Status:  Euvolemic    PONV Controlled:  Controlled    Airway Patency:  Patent    Post Op Vitals Reviewed: Yes          Staff: CRNA       Comments: vss sv nonnobstructed uneventful          BP   151/88   Temp      Pulse  90   Resp   24   SpO2   92

## 2018-08-02 NOTE — H&P
History and Physical -  [facility]  Herlinda Castro 69 y o  male MRN: [id number]    HPI: Herlinda Castro is a 69y o  year old male who presents   For evaluation of longstanding symptoms of GERD  Review of Systems    Historical Information   Past Medical History:   Diagnosis Date    Asthma     Benign prostatic hyperplasia     Blood transfusion reaction     Bronchitis 04/30/2018    Cardiac arrest ([facility] )     oct 2000    Diverticulosis     GERD (gastroesophageal reflux disease)     Hepatitis A     History of colonic polyps     Internal bleeding     2000    Migraine     Osteoarthritis     Seizures ([facility] )     Stroke ([facility] )     2001, 2003     Past Surgical History:   Procedure Laterality Date    APPENDECTOMY      DENTAL SURGERY      wisdom tooth extraction    HEMORRHOID SURGERY      MYRINGOTOMY W/ TUBES      [place] COLONOSCOPY FLX DX W/COLLJ SPEC WHEN PFRMD N/A 7/7/2017    Procedure: COLONOSCOPY;  Surgeon: Evan Rodrigues MD;  [facility];   Service: Colorectal    PROSTATE SURGERY      TONSILLECTOMY      VASECTOMY       Social History   History   Alcohol Use No     History   Drug Use No     History   Smoking Status    Never Smoker   Smokeless Tobacco    Never Used     Comment: never a smoker as per [employer]     Family History   Problem Relation Age of Onset    Cancer Father         colon    Diabetes Father     Cancer Brother         colo-rectal, liver    Diabetes Mother     Lupus Sister         systemic-erythematosus    Diabetes Family     Kidney disease Family        Meds/Allergies     Prescriptions Prior to Admission   Medication    ALPRAZolam (XANAX) 1 mg tablet    aspirin 81 MG tablet    aspirin-acetaminophen-caffeine (EXCEDRIN MIGRAINE) 250-250-65 MG per tablet    b complex vitamins capsule    butalbital-acetaminophen-caffeine (FIORICET,ESGIC) -40 mg per tablet    Fenoprofen Calcium 400 MG CAPS    fluticasone (FLOVENT HFA) 220 mcg/act inhaler    metaxalone (SKELAXIN) 400 MG tablet    Multiple Vitamins-Minerals (MULTIVITAMIN WITH MINERALS) tablet    omeprazole (PriLOSEC) 20 mg delayed release capsule    ondansetron (ZOFRAN-ODT) 4 mg disintegrating tablet    Potassium 95 MG TABS    nortriptyline (PAMELOR) 25 mg capsule       Allergies   Allergen Reactions    Dust Mite Extract Shortness Of Breath and Allergic Rhinitis    Iodinated Diagnostic Agents Anaphylaxis    Pollen Extract Shortness Of Breath and Allergic Rhinitis    Other      Annotation - [id number]: petrolium derivatives, and perfume    Chlorzoxazone Rash    Codeine Itching and Rash       Objective     Blood pressure 137/96, pulse 65, temperature 97 7 °F (36 5 °C), temperature source Temporal, resp  rate 16, height 5' 7" (1 702 m), weight 82 3 kg (181 lb 6 oz), SpO2 93 %  PHYSICAL EXAM    Gen: NAD  CV: RRR  CHEST: Clear  ABD: soft, NT/ND  EXT: no edema  Neuro: AAO      ASSESSMENT/PLAN:  This is a 69y o  year old male here for   Evaluation of Quinn's given longstanding history of GERD      PLAN:   Procedure:   EGD

## 2018-08-03 ENCOUNTER — OFFICE VISIT (OUTPATIENT)
Dept: INTERNAL MEDICINE CLINIC | Age: 68
End: 2018-08-03
Payer: MEDICARE

## 2018-08-03 ENCOUNTER — TELEPHONE (OUTPATIENT)
Dept: GASTROENTEROLOGY | Facility: AMBULARY SURGERY CENTER | Age: 68
End: 2018-08-03

## 2018-08-03 VITALS
SYSTOLIC BLOOD PRESSURE: 140 MMHG | DIASTOLIC BLOOD PRESSURE: 100 MMHG | HEART RATE: 102 BPM | TEMPERATURE: 98 F | OXYGEN SATURATION: 96 %

## 2018-08-03 DIAGNOSIS — G43.009 MIGRAINE WITHOUT AURA AND WITHOUT STATUS MIGRAINOSUS, NOT INTRACTABLE: Primary | ICD-10-CM

## 2018-08-03 DIAGNOSIS — F41.9 ANXIETY: ICD-10-CM

## 2018-08-03 DIAGNOSIS — G44.209 MUSCLE TENSION HEADACHE: ICD-10-CM

## 2018-08-03 DIAGNOSIS — M25.512 CHRONIC LEFT SHOULDER PAIN: ICD-10-CM

## 2018-08-03 DIAGNOSIS — G89.29 CHRONIC LEFT SHOULDER PAIN: ICD-10-CM

## 2018-08-03 PROCEDURE — 99214 OFFICE O/P EST MOD 30 MIN: CPT | Performed by: INTERNAL MEDICINE

## 2018-08-03 RX ORDER — CYCLOBENZAPRINE HCL 5 MG
5 TABLET ORAL
Qty: 30 TABLET | Refills: 0 | Status: SHIPPED | OUTPATIENT
Start: 2018-08-03 | End: 2020-09-23 | Stop reason: SDUPTHER

## 2018-08-03 RX ORDER — NORTRIPTYLINE HYDROCHLORIDE 25 MG/1
25 CAPSULE ORAL
Qty: 30 CAPSULE | Refills: 3 | Status: SHIPPED | OUTPATIENT
Start: 2018-08-03 | End: 2018-08-14 | Stop reason: ALTCHOICE

## 2018-08-03 RX ORDER — IBUPROFEN 800 MG/1
800 TABLET ORAL EVERY 12 HOURS PRN
Qty: 30 TABLET | Refills: 0 | Status: SHIPPED | OUTPATIENT
Start: 2018-08-03 | End: 2019-05-13 | Stop reason: SDUPTHER

## 2018-08-03 NOTE — ASSESSMENT & PLAN NOTE
Will prescribe cyclobenzaprine 5-10 mg to be taken at bedtime as needed for muscle spasms as well as ibuprofen 800 mg every 12 hours not to exceed 3 2 g in a 24 hour period  Will discontinue metaxalone

## 2018-08-03 NOTE — ASSESSMENT & PLAN NOTE
Will defer on starting anxiolytics /antidepressants at this time  Symptoms of headache may be attributed to incorrect eyeglasses  He is to  is Glasses within the next week to which he will follow up with me prior to his departure to [facility]

## 2018-08-03 NOTE — ASSESSMENT & PLAN NOTE
Will prescribe nortriptyline 25 mg at bedtime and to continue with Fioricet and Zofran as needed for breakthrough headaches and nausea , respectively  Follow-up in 1 week prior to his departure tube resolved  Headaches may be attributed to incorrect eye glasses

## 2018-08-03 NOTE — TELEPHONE ENCOUNTER
----- Message from Ishmael Kim MD sent at 8/2/2018  5:23 PM EDT -----  Hepatitis B and C were negative

## 2018-08-03 NOTE — PROGRESS NOTES
Assessment/Plan:    Anxiety    Will defer on starting anxiolytics /antidepressants at this time  Symptoms of headache may be attributed to incorrect eyeglasses  He is to  is Glasses within the next week to which he will follow up with me prior to his departure to [place]  Migraine without aura and without status migrainosus, not intractable   Will prescribe nortriptyline 25 mg at bedtime and to continue with Fioricet and Zofran as needed for breakthrough headaches and nausea , respectively  Follow-up in 1 week prior to his departure tube resolved  Headaches may be attributed to incorrect eye glasses  Chronic left shoulder pain    Will prescribe cyclobenzaprine 5-10 mg to be taken at bedtime as needed for muscle spasms as well as ibuprofen 800 mg every 12 hours not to exceed 3 2 g in a 24 hour period  Will discontinue metaxalone  Diagnoses and all orders for this visit:    Migraine without aura and without status migrainosus, not intractable  -     nortriptyline (PAMELOR) 25 mg capsule; Take 1 capsule (25 mg total) by mouth daily at bedtime    Anxiety    Muscle tension headache    Chronic left shoulder pain  -     ibuprofen (MOTRIN) 800 mg tablet; Take 1 tablet (800 mg total) by mouth every 12 (twelve) hours as needed for mild pain  -     cyclobenzaprine (FLEXERIL) 5 mg tablet; Take 1 tablet (5 mg total) by mouth daily at bedtime as needed for muscle spasms        Time spent during encounter: 25 minutes  Subjective:      Patient ID: Herlinda Castro is a 69 y o  male  78-year-old male is seen today for follow-up of migraine headaches  He was seen on Monday, 7/30, to which he had an acute migraine flare  He was to start on nortriptyline however due to  Pharmacy issues was not dispensed  He was given Zofran which he has been taken as needed for nausea and has been helping  He underwent MRI of the brain which showed "No MR evidence of acute ischemia   2   Mild diffuse white matter changes most likely related to microangiopathy rather than other etiologies as described above "  Since last visit, he continues to have daily headaches, mostly during 2-3AM and waking him up from sleep  He also notes having increased anxiety over the past few months, which started approximately the same time as the headaches  He takes Xanax PRN at bedtime for sleep  He also notes having worsening left shoulder pain, acute on chronic  No recent trauma  Neurologic Problem   The patient's primary symptoms include near-syncope and weakness  The patient's pertinent negatives include no altered mental status, clumsiness, focal sensory loss, focal weakness, loss of balance, memory loss, slurred speech, syncope or visual change  This is a recurrent problem  The current episode started more than 1 month ago  The neurological problem developed suddenly  The problem has been waxing and waning since onset  Associated symptoms include back pain, dizziness, fatigue, headaches, light-headedness, nausea, neck pain and vomiting  Pertinent negatives include no abdominal pain, auditory change, aura, bladder incontinence, bowel incontinence, chest pain, confusion, diaphoresis, fever, palpitations, shortness of breath or vertigo  Past treatments include neck support, position change and walking  The treatment provided mild relief  Anxiety   Presents for initial visit  Onset was 1 to 6 months ago  The problem has been gradually worsening  Symptoms include dizziness, insomnia and nausea  Patient reports no chest pain, compulsions, confusion, decreased concentration, depressed mood, dry mouth, excessive worry, feeling of choking, hyperventilation, impotence, irritability, malaise, muscle tension, nervous/anxious behavior, obsessions, palpitations, panic, restlessness, shortness of breath or suicidal ideas  The severity of symptoms is mild  Exacerbated by: aging  living alone  The patient sleeps 7 hours per night   Nighttime awakenings: occasional, one to two  Past treatments include nothing  The following portions of the patient's history were reviewed and updated as appropriate: allergies, current medications, past family history, past medical history, past social history, past surgical history and problem list     Review of Systems   Constitutional: Positive for fatigue  Negative for activity change, appetite change, chills, diaphoresis, fever and irritability  HENT: Negative for congestion, postnasal drip, rhinorrhea, sinus pain, sinus pressure, sneezing and sore throat  Eyes: Negative for visual disturbance  Respiratory: Negative for apnea, cough, choking, chest tightness, shortness of breath and wheezing  Cardiovascular: Positive for near-syncope  Negative for chest pain, palpitations and leg swelling  Gastrointestinal: Positive for nausea and vomiting  Negative for abdominal distention, abdominal pain, anal bleeding, blood in stool, bowel incontinence, constipation and diarrhea  Endocrine: Negative for cold intolerance and heat intolerance  Genitourinary: Negative for bladder incontinence, difficulty urinating, dysuria, hematuria and impotence  Musculoskeletal: Positive for back pain and neck pain  Skin: Negative  Neurological: Positive for dizziness, weakness, light-headedness and headaches  Negative for vertigo, focal weakness, syncope, numbness and loss of balance  Hematological: Negative for adenopathy  Psychiatric/Behavioral: Negative for agitation, confusion, decreased concentration, memory loss, sleep disturbance and suicidal ideas  The patient has insomnia  The patient is not nervous/anxious  All other systems reviewed and are negative          Past Medical History:   Diagnosis Date    Asthma     Benign prostatic hyperplasia     Blood transfusion reaction     Bronchitis 04/30/2018    Cardiac arrest [facility])     oct 2000    Diverticulosis     GERD (gastroesophageal reflux disease)  Hepatitis A     History of colonic polyps     Internal bleeding     2000    Migraine     Osteoarthritis     [facility]izures [facility] [address] )     Stroke ([facility] [address] )     2001, 2003         Current Outpatient Prescriptions:     ALPRAZolam (XANAX) 1 mg tablet, Take 1 tablet (1 mg total) by mouth daily at bedtime as needed for sleep, Disp: 90 tablet, Rfl: 0    aspirin 81 MG tablet, Take 81 mg by mouth daily  , Disp: , Rfl:     aspirin-acetaminophen-caffeine (EXCEDRIN MIGRAINE) 250-250-65 MG per tablet, Take 1 tablet by mouth every 6 (six) hours as needed for headaches  , Disp: , Rfl:     b complex vitamins capsule, Take 1 capsule by mouth daily, Disp: , Rfl:     butalbital-acetaminophen-caffeine (FIORICET,ESGIC) -40 mg per tablet, Take 1 tablet by mouth every 4 (four) hours as needed for headaches, Disp: 30 tablet, Rfl: 0    Fenoprofen Calcium 400 MG CAPS, Take 1 capsule by mouth daily, Disp: , Rfl:     fluticasone (FLOVENT HFA) 220 mcg/act inhaler, Inhale 1 puff 2 (two) times a day (Patient taking differently: Inhale 1 puff 2 (two) times a day as needed  ), Disp: 3 Inhaler, Rfl: 3    Multiple Vitamins-Minerals (MULTIVITAMIN WITH MINERALS) tablet, Take 1 tablet by mouth daily, Disp: , Rfl:     ondansetron (ZOFRAN-ODT) 4 mg disintegrating tablet, Take 1 tablet (4 mg total) by mouth every 6 (six) hours as needed for nausea or vomiting, Disp: 30 tablet, Rfl: 0    pantoprazole (PROTONIX) 40 mg tablet, Take 1 tablet (40 mg total) by mouth daily for 90 days, Disp: 90 tablet, Rfl: 3    Potassium 95 MG TABS, Take 1 tablet by mouth daily, Disp: , Rfl:     cyclobenzaprine (FLEXERIL) 5 mg tablet, Take 1 tablet (5 mg total) by mouth daily at bedtime as needed for muscle spasms, Disp: 30 tablet, Rfl: 0    ibuprofen (MOTRIN) 800 mg tablet, Take 1 tablet (800 mg total) by mouth every 12 (twelve) hours as needed for mild pain, Disp: 30 tablet, Rfl: 0    nortriptyline (PAMELOR) 25 mg capsule, Take 1 capsule (25 mg total) by mouth daily at bedtime, Disp: 30 capsule, Rfl: 3    Allergies   Allergen Reactions    Dust Mite Extract Shortness Of Breath and Allergic Rhinitis    Iodinated Diagnostic Agents Anaphylaxis    Pollen Extract Shortness Of Breath and Allergic Rhinitis    Other      Annotation - [id number]: petrolium derivatives, and perfume    Chlorzoxazone Rash    Codeine Itching and Rash       Social History   Past Surgical History:   Procedure Laterality Date    APPENDECTOMY      DENTAL SURGERY      wisdom tooth extraction    HEMORRHOID SURGERY      MYRINGOTOMY W/ TUBES      OR COLONOSCOPY FLX DX W/COLLJ SPEC WHEN PFRMD N/A 7/7/2017    Procedure: COLONOSCOPY;  Surgeon: Irlanda Norwood MD;  [facility]; Service: Colorectal    OR ESOPHAGOGASTRODUODENOSCOPY TRANSORAL DIAGNOSTIC N/A 8/2/2018    Procedure: ESOPHAGOGASTRODUODENOSCOPY (EGD); Surgeon: Ishmael Kim MD;  [facility]; Service: Gastroenterology    PROSTATE SURGERY      TONSILLECTOMY      VASECTOMY       Family History   Problem Relation Age of Onset    Cancer Father         colon    Diabetes Father     Cancer Brother         colo-rectal, liver    Diabetes Mother     Lupus Sister         systemic-erythematosus    Diabetes Family     Kidney disease Family        Objective:  /100 (BP Location: Left arm, Patient Position: Sitting, Cuff Size: Adult)   Pulse 102   Temp 98 °F (36 7 °C) (Tympanic)   SpO2 96% Comment: room air    Recent Results (from the past 1344 hour(s))   Chronic Hepatitis Panel    Collection Time: 07/30/18  9:07 AM   Result Value Ref Range    Hepatitis B Surface Ag Non-reactive Non-reactive, NonReactive - Confirmed    Hepatitis C Ab Non-reactive Non-reactive    Hep B C IgM Non-reactive Non-reactive    Hep B Core Total Ab Non-reactive Non-reactive            Physical Exam   Constitutional: He is oriented to person, place, and time  He appears well-developed and well-nourished  No distress     HENT:   Head: Normocephalic and atraumatic  Eyes: Conjunctivae and EOM are normal  Pupils are equal, round, and reactive to light  Right eye exhibits no discharge  Left eye exhibits no discharge  No scleral icterus  Neck: Normal range of motion  Neck supple  No JVD present  No thyromegaly present  Cardiovascular: Normal rate, regular rhythm, normal heart sounds and intact distal pulses  Exam reveals no gallop and no friction rub  No murmur heard  Pulmonary/Chest: Effort normal and breath sounds normal  No respiratory distress  He has no wheezes  He has no rales  He exhibits no tenderness  Abdominal: Soft  Bowel sounds are normal  He exhibits no distension and no mass  There is no tenderness  There is no rebound and no guarding  Musculoskeletal: Normal range of motion  He exhibits no edema, tenderness or deformity  Lymphadenopathy:     He has no cervical adenopathy  Neurological: He is alert and oriented to person, place, and time  He has normal reflexes  No cranial nerve deficit  Coordination normal    Skin: Skin is warm and dry  No rash noted  He is not diaphoretic  No erythema  No pallor  Psychiatric: He has a normal mood and affect  His behavior is normal  Judgment and thought content normal    Nursing note and vitals reviewed

## 2018-08-14 ENCOUNTER — OFFICE VISIT (OUTPATIENT)
Dept: INTERNAL MEDICINE CLINIC | Age: 68
End: 2018-08-14
Payer: MEDICARE

## 2018-08-14 VITALS
SYSTOLIC BLOOD PRESSURE: 132 MMHG | DIASTOLIC BLOOD PRESSURE: 86 MMHG | HEART RATE: 112 BPM | TEMPERATURE: 98.1 F | WEIGHT: 186.4 LBS | BODY MASS INDEX: 29.19 KG/M2 | OXYGEN SATURATION: 94 %

## 2018-08-14 DIAGNOSIS — G47.00 INSOMNIA, UNSPECIFIED TYPE: ICD-10-CM

## 2018-08-14 DIAGNOSIS — F41.9 ANXIETY: ICD-10-CM

## 2018-08-14 DIAGNOSIS — F32.A DEPRESSION, UNSPECIFIED DEPRESSION TYPE: ICD-10-CM

## 2018-08-14 DIAGNOSIS — G43.009 MIGRAINE WITHOUT AURA AND WITHOUT STATUS MIGRAINOSUS, NOT INTRACTABLE: Primary | ICD-10-CM

## 2018-08-14 PROBLEM — R05.9 COUGH: Status: RESOLVED | Noted: 2018-05-09 | Resolved: 2018-08-14

## 2018-08-14 PROCEDURE — 99214 OFFICE O/P EST MOD 30 MIN: CPT | Performed by: INTERNAL MEDICINE

## 2018-08-14 RX ORDER — PAROXETINE HYDROCHLORIDE 20 MG/1
TABLET, FILM COATED ORAL
Qty: 120 TABLET | Refills: 0 | Status: SHIPPED | OUTPATIENT
Start: 2018-08-14 | End: 2018-11-16

## 2018-08-14 NOTE — PROGRESS NOTES
Assessment/Plan:    Migraine without aura and without status migrainosus, not intractable   Plan will be to start SSRI therapy to treat both migraine headaches prophylactically as well as treating anxiety /depression  Will start paroxetine 20 mg daily  He is planning to leave for [place] tomorrow and will return in early December at which we will follow up then  He does have a primary care physician in [facility] to which he can follow up with a if he develops any side effects or worsening headaches  Continue with Excedrin migraine  Or Fioricet for breakthrough headaches  Depressed    Will start paroxetine 20 mg daily  Diagnoses and all orders for this visit:    Migraine without aura and without status migrainosus, not intractable  -     PARoxetine (PAXIL) 20 mg tablet; Take half a tablet daily for 1 week, then 1 tablet daily thereafter  Depression, unspecified depression type  -     PARoxetine (PAXIL) 20 mg tablet; Take half a tablet daily for 1 week, then 1 tablet daily thereafter  Insomnia, unspecified type  -     PARoxetine (PAXIL) 20 mg tablet; Take half a tablet daily for 1 week, then 1 tablet daily thereafter  Anxiety  -     PARoxetine (PAXIL) 20 mg tablet; Take half a tablet daily for 1 week, then 1 tablet daily thereafter  Time spent during encounter: 25 minutes  Subjective:      Patient ID: Alexandro Esteban is a 69 y o  male  58-year-old male is seen today for follow-up of migraine headaches  Since last visit, he was prescribed Nortriptyline 25 mg and has been compliant over the past 2-weeks, however ha snot noticed any change/improvement of migraine headaches  Regarding breakthrough migraine headaches, he has been using Excedrin Migraine for first line,  Fioricet if headache persists despite Excedrin, and then a hot shower  He was prescribed new eye glasses, which has also not helped with migraines  Again, MRI of the brain was negative for acute pathology       Previous medications that he has been on for migraine prophylaxis are: Topamax, which was discontinued due to improvement of migraines  Migraine    This is a chronic problem  The current episode started more than 1 year ago  The problem has been unchanged  The pain is located in the parietal, frontal, bilateral and temporal region  The pain does not radiate  The quality of the pain is described as throbbing, squeezing and band-like  Associated symptoms include nausea, photophobia and vomiting  Pertinent negatives include no abdominal pain, coughing, dizziness, fever, insomnia, numbness, rhinorrhea, sinus pressure, sore throat or weakness  Exacerbated by: odors (perfumes, gasoline) He has tried triptans and antidepressants for the symptoms  The treatment provided mild relief  Anxiety   Presents for follow-up visit  Symptoms include excessive worry and nausea  Patient reports no chest pain, compulsions, confusion, decreased concentration, depressed mood, dizziness, dry mouth, feeling of choking, hyperventilation, impotence, insomnia, irritability, malaise, muscle tension, nervous/anxious behavior, obsessions, palpitations, panic, restlessness, shortness of breath or suicidal ideas  The severity of symptoms is mild  The patient sleeps 6 hours per night  Nighttime awakenings: one to two  The following portions of the patient's history were reviewed and updated as appropriate: allergies, current medications, past family history, past medical history, past social history, past surgical history and problem list     Review of Systems   Constitutional: Negative for activity change, appetite change, chills, diaphoresis, fatigue, fever and irritability  HENT: Negative for congestion, postnasal drip, rhinorrhea, sinus pain, sinus pressure, sneezing and sore throat  Eyes: Positive for photophobia  Negative for visual disturbance     Respiratory: Negative for apnea, cough, choking, chest tightness, shortness of breath and wheezing  Cardiovascular: Negative for chest pain, palpitations and leg swelling  Gastrointestinal: Positive for nausea and vomiting  Negative for abdominal distention, abdominal pain, anal bleeding, blood in stool, constipation and diarrhea  Endocrine: Negative for cold intolerance and heat intolerance  Genitourinary: Negative for difficulty urinating, dysuria, hematuria and impotence  Musculoskeletal: Negative  Skin: Negative  Neurological: Negative for dizziness, weakness, light-headedness, numbness and headaches  Hematological: Negative for adenopathy  Psychiatric/Behavioral: Positive for sleep disturbance  Negative for agitation, confusion, decreased concentration and suicidal ideas  The patient is not nervous/anxious and does not have insomnia  All other systems reviewed and are negative          Past Medical History:   Diagnosis Date    Asthma     Benign prostatic hyperplasia     Blood transfusion reaction     Bronchitis 04/30/2018    [facility] arrest ([facility] )     oct 2000    Diverticulosis     GERD (gastroesophageal reflux disease)     Hepatitis A     History of colonic polyps     Internal bleeding     2000    Migraine     Osteoarthritis     Seizures ([facility] )     Stroke ([place] [address] )     2001, 2003         Current Outpatient Prescriptions:     ALPRAZolam (XANAX) 1 mg tablet, Take 1 tablet (1 mg total) by mouth daily at bedtime as needed for sleep, Disp: 90 tablet, Rfl: 0    aspirin 81 MG tablet, Take 81 mg by mouth daily  , Disp: , Rfl:     aspirin-acetaminophen-caffeine (EXCEDRIN MIGRAINE) 250-250-65 MG per tablet, Take 1 tablet by mouth every 6 (six) hours as needed for headaches  , Disp: , Rfl:     b complex vitamins capsule, Take 1 capsule by mouth daily, Disp: , Rfl:     butalbital-acetaminophen-caffeine (FIORICET,ESGIC) -40 mg per tablet, Take 1 tablet by mouth every 4 (four) hours as needed for headaches, Disp: 30 tablet, Rfl: 0    cyclobenzaprine (FLEXERIL) 5 mg tablet, Take 1 tablet (5 mg total) by mouth daily at bedtime as needed for muscle spasms, Disp: 30 tablet, Rfl: 0    Fenoprofen Calcium 400 MG CAPS, Take 1 capsule by mouth daily, Disp: , Rfl:     fluticasone (FLOVENT HFA) 220 mcg/act inhaler, Inhale 1 puff 2 (two) times a day (Patient taking differently: Inhale 1 puff 2 (two) times a day as needed  ), Disp: 3 Inhaler, Rfl: 3    ibuprofen (MOTRIN) 800 mg tablet, Take 1 tablet (800 mg total) by mouth every 12 (twelve) hours as needed for mild pain, Disp: 30 tablet, Rfl: 0    Multiple Vitamins-Minerals (MULTIVITAMIN WITH MINERALS) tablet, Take 1 tablet by mouth daily, Disp: , Rfl:     ondansetron (ZOFRAN-ODT) 4 mg disintegrating tablet, Take 1 tablet (4 mg total) by mouth every 6 (six) hours as needed for nausea or vomiting, Disp: 30 tablet, Rfl: 0    pantoprazole (PROTONIX) 40 mg tablet, Take 1 tablet (40 mg total) by mouth daily for 90 days, Disp: 90 tablet, Rfl: 3    Potassium 95 MG TABS, Take 1 tablet by mouth daily, Disp: , Rfl:     PARoxetine (PAXIL) 20 mg tablet, Take half a tablet daily for 1 week, then 1 tablet daily thereafter , Disp: 120 tablet, Rfl: 0    Allergies   Allergen Reactions    Dust Mite Extract Shortness Of Breath and Allergic Rhinitis    Iodinated Diagnostic Agents Anaphylaxis    Pollen Extract Shortness Of Breath and Allergic Rhinitis    Other      Annotation - [id number]: petrolium derivatives, and perfume    Chlorzoxazone Rash    Codeine Itching and Rash       Social History   Past Surgical History:   Procedure Laterality Date    APPENDECTOMY      DENTAL SURGERY      wisdom tooth extraction    HEMORRHOID SURGERY      MYRINGOTOMY W/ TUBES      UT COLONOSCOPY FLX DX W/COLLJ SPEC WHEN PFRMD N/A 7/7/2017    Procedure: COLONOSCOPY;  Surgeon: Sindy Diaz MD;  [facility];   Service: Colorectal    UT ESOPHAGOGASTRODUODENOSCOPY TRANSORAL DIAGNOSTIC N/A 8/2/2018    Procedure: ESOPHAGOGASTRODUODENOSCOPY (EGD); Surgeon: Stephanie Pa MD;  [facility]; Service: Gastroenterology    PROSTATE SURGERY      TONSILLECTOMY      VASECTOMY       Family History   Problem Relation Age of Onset    Cancer Father         colon    Diabetes Father     Cancer Brother         colo-rectal, liver    Diabetes Mother     Lupus Sister         systemic-erythematosus    Diabetes Family     Kidney disease Family        Objective:  /86 (BP Location: Left arm, Patient Position: Sitting, Cuff Size: Standard)   Pulse (!) 112   Temp 98 1 °F (36 7 °C)   Wt 84 6 kg (186 lb 6 4 oz)   SpO2 94%   BMI 29 19 kg/m²     Recent Results (from the past 1344 hour(s))   Chronic Hepatitis Panel    Collection Time: 07/30/18  9:07 AM   Result Value Ref Range    Hepatitis B Surface Ag Non-reactive Non-reactive, NonReactive - Confirmed    Hepatitis C Ab Non-reactive Non-reactive    Hep B C IgM Non-reactive Non-reactive    Hep B Core Total Ab Non-reactive Non-reactive   Tissue Exam    Collection Time: 08/02/18  9:09 AM   Result Value Ref Range    Case Report       Surgical Pathology Report                         Case: [id number]                                   Authorizing Provider:  Stephanie Pa MD       Collected:           08/02/2018 0909              Ordering Location:     [facility]        Received:            08/02/2018 [address] Endoscopy                                                           Pathologist:           Joy Primrose, MD                                                         Specimens:   A) - Duodenum                                                                                       B) - Stomach                                                                               Final Diagnosis       A  Duodenum, biopsy:     - No significant pathologic abnormalities       - No villous atrophy, increased intraepithelial lymphocytes or crypt hyperplasia to suggest malabsorptive enteropathy      - No active inflammation, granulomas, organisms, dysplasia or neoplasia identified  B   Stomach, biopsy:     - Antral and oxyntoantral mucosa with mild chronic inactive gastritis  - No Helicobacter pylori organisms identified on immunostaining      - No intestinal metaplasia, dysplasia or neoplasia identified  Additional Information       All controls performed with the immunohistochemical stains reported above reacted appropriately  These tests were developed and their performance characteristics determined by [facility] or [facility]  They may not be cleared or approved by the [place]  Food and Drug Administration  The FDA has determined that such clearance or approval is not necessary  These tests are used for clinical purposes  They should not be regarded as investigational or for research  This laboratory has been approved by [place] 88, designated as a high-complexity laboratory and is qualified to perform these tests  - Interpretation performed at [facility], [address]       Gross Description        A  The specimen is received in formalin, labeled with the patient's name and hospital number, and is designated "duodenum, are multiple irregularly-shaped fragments of tan soft tissue measuring 0 5 x 0 5 x 0 1 cm in aggregate  Entirely submitted  1 cassette  B  The specimen is received in formalin, labeled with the patient's name and hospital number, and is designated "stomach, are multiple irregularly-shaped fragments of tan soft tissue measuring 0 6 x 0 6 x 0 1 cm in aggregate  Entirely submitted  1 cassette  Note: The estimated total formalin fixation time based upon information provided by the submitting clinician and the standard processing schedule is under 72 hours    [email or web address]            Clinical Information R/o celiac             Physical Exam   Constitutional: He is oriented to person, place, and time  He appears well-developed and well-nourished  No distress  HENT:   Head: Normocephalic and atraumatic  Eyes: Conjunctivae and EOM are normal  Pupils are equal, round, and reactive to light  Right eye exhibits no discharge  Left eye exhibits no discharge  No scleral icterus  Neck: Normal range of motion  Neck supple  No JVD present  No thyromegaly present  Cardiovascular: Normal rate, regular rhythm, normal heart sounds and intact distal pulses  Exam reveals no gallop and no friction rub  No murmur heard  Pulmonary/Chest: Effort normal and breath sounds normal  No respiratory distress  He has no wheezes  He has no rales  He exhibits no tenderness  Abdominal: Soft  Bowel sounds are normal  He exhibits no distension and no mass  There is no tenderness  There is no rebound and no guarding  Musculoskeletal: Normal range of motion  He exhibits no edema, tenderness or deformity  Lymphadenopathy:     He has no cervical adenopathy  Neurological: He is alert and oriented to person, place, and time  He has normal reflexes  No cranial nerve deficit  Coordination normal    Skin: Skin is warm and dry  No rash noted  He is not diaphoretic  No erythema  No pallor  Psychiatric: He has a normal mood and affect  His behavior is normal  Judgment and thought content normal    Nursing note and vitals reviewed

## 2018-08-14 NOTE — ASSESSMENT & PLAN NOTE
Plan will be to start SSRI therapy to treat both migraine headaches prophylactically as well as treating anxiety /depression  Will start paroxetine 20 mg daily  He is planning to leave for result tomorrow and will return in early December at which we will follow up then  He does have a primary care physician in [facility] to which he can follow up with a if he develops any side effects or worsening headaches  Continue with Excedrin migraine  Or Fioricet for breakthrough headaches

## 2018-08-16 ENCOUNTER — TELEPHONE (OUTPATIENT)
Dept: GASTROENTEROLOGY | Facility: AMBULARY SURGERY CENTER | Age: 68
End: 2018-08-16

## 2018-08-16 NOTE — TELEPHONE ENCOUNTER
----- Message from Helder Kendrick MD sent at 8/15/2018  4:34 PM EDT -----  Please inform the patient that the biopsies from the stomach were negative for H pylori and duodenal biopsies were negative for celiac disease  He does not need a repeat EGD unless he has any alarm symptoms  He should continue PPI on a daily basis however as he does have mild gastritis

## 2018-11-16 ENCOUNTER — OFFICE VISIT (OUTPATIENT)
Dept: INTERNAL MEDICINE CLINIC | Age: 68
End: 2018-11-16

## 2018-11-16 VITALS
TEMPERATURE: 98.3 F | SYSTOLIC BLOOD PRESSURE: 150 MMHG | OXYGEN SATURATION: 97 % | HEART RATE: 77 BPM | DIASTOLIC BLOOD PRESSURE: 85 MMHG

## 2018-11-16 DIAGNOSIS — G43.009 MIGRAINE WITHOUT AURA AND WITHOUT STATUS MIGRAINOSUS, NOT INTRACTABLE: Primary | ICD-10-CM

## 2018-11-16 DIAGNOSIS — R03.0 ELEVATED BP WITHOUT DIAGNOSIS OF HYPERTENSION: ICD-10-CM

## 2018-11-16 DIAGNOSIS — J45.20 MILD INTERMITTENT ASTHMA WITHOUT COMPLICATION: ICD-10-CM

## 2018-11-16 DIAGNOSIS — K21.9 GERD WITHOUT ESOPHAGITIS: ICD-10-CM

## 2018-11-16 DIAGNOSIS — G47.00 INSOMNIA, UNSPECIFIED TYPE: ICD-10-CM

## 2018-11-16 DIAGNOSIS — F32.A DEPRESSION, UNSPECIFIED DEPRESSION TYPE: ICD-10-CM

## 2018-11-16 DIAGNOSIS — F41.9 ANXIETY: ICD-10-CM

## 2018-11-16 DIAGNOSIS — J30.1 SEASONAL ALLERGIC RHINITIS DUE TO POLLEN: ICD-10-CM

## 2018-11-16 DIAGNOSIS — Z13.220 LIPID SCREENING: ICD-10-CM

## 2018-11-16 PROBLEM — J45.40 MODERATE PERSISTENT ASTHMA WITHOUT COMPLICATION: Status: ACTIVE | Noted: 2018-05-16

## 2018-11-16 PROCEDURE — 99214 OFFICE O/P EST MOD 30 MIN: CPT | Performed by: INTERNAL MEDICINE

## 2018-11-16 RX ORDER — PAROXETINE HYDROCHLORIDE 40 MG/1
40 TABLET, FILM COATED ORAL EVERY MORNING
Qty: 90 TABLET | Refills: 1 | Status: SHIPPED | OUTPATIENT
Start: 2018-11-16 | End: 2020-09-23

## 2018-11-16 RX ORDER — RIZATRIPTAN BENZOATE 10 MG/1
10 TABLET ORAL ONCE AS NEEDED
Qty: 90 TABLET | Refills: 0 | Status: SHIPPED | OUTPATIENT
Start: 2018-11-16 | End: 2018-11-26 | Stop reason: SDUPTHER

## 2018-11-16 NOTE — ASSESSMENT & PLAN NOTE
Will increase paroxetine to 40 mg daily for prophylaxis  Will discontinue Fioricet for breakthrough headaches  He has been on sumatriptan in the past without success as well as ergotamines  Will trial RizaTriptan  Will also refer to Neurology (headache specialist) for further evaluation

## 2018-11-16 NOTE — PROGRESS NOTES
Assessment/Plan:    GERD without esophagitis  Continue with Protonix 40 mg daily  Well controlled  Seasonal allergic rhinitis due to pollen  Continue with Benadryl and Flonase  Mild intermittent asthma without complication  Continue with Flovent 1 puff twice a day as needed  Migraine without aura and without status migrainosus, not intractable  Will increase paroxetine to 40 mg daily for prophylaxis  Will discontinue Fioricet for breakthrough headaches  He has been on sumatriptan in the past without success as well as ergotamines  Will trial RizaTriptan  Will also refer to Neurology (headache specialist) for further evaluation  Elevated BP without diagnosis of hypertension  Continue to monitor off antihypertensives  Elevation of blood pressure is likely due to headache  Diagnoses and all orders for this visit:    Migraine without aura and without status migrainosus, not intractable  -     PARoxetine (PAXIL) 40 MG tablet; Take 1 tablet (40 mg total) by mouth every morning  -     Ambulatory referral to Neurology; Future  -     rizatriptan (MAXALT) 10 MG tablet; Take 1 tablet (10 mg total) by mouth once as needed for migraine for up to 1 dose May repeat in 2 hours if needed  -     CBC; Future  -     Comprehensive metabolic panel; Future  -     Lipid panel; Future  -     TSH, 3rd generation with Free T4 reflex; Future  -     Vitamin B12; Future  -     Vitamin B6; Future  -     Vitamin D 25 hydroxy; Future  -     C-reactive protein; Future  -     Sedimentation rate, automated; Future    Depression, unspecified depression type  -     PARoxetine (PAXIL) 40 MG tablet; Take 1 tablet (40 mg total) by mouth every morning  -     CBC; Future  -     Comprehensive metabolic panel; Future  -     TSH, 3rd generation with Free T4 reflex; Future  -     Vitamin D 25 hydroxy; Future    Anxiety  -     PARoxetine (PAXIL) 40 MG tablet; Take 1 tablet (40 mg total) by mouth every morning  -     CBC;  Future  - Comprehensive metabolic panel; Future  -     Lipid panel; Future  -     TSH, 3rd generation with Free T4 reflex; Future  -     Vitamin D 25 hydroxy; Future    Insomnia, unspecified type    GERD without esophagitis    Seasonal allergic rhinitis due to pollen    Mild intermittent asthma without complication  -     CBC; Future  -     Comprehensive metabolic panel; Future    Lipid screening    Elevated BP without diagnosis of hypertension  -     Lipid panel; Future          Subjective:      Patient ID: Symone Campbell is a 69 y o  male  47-year-old male is seen today for follow-up of chronic conditions  Regarding his migraine headaches, they were initially improving when he 1st started paroxetine however now has returned to the previous recurrence of every 2-3 days and the headaches lasting approximately the same, 2-3 days  He is also on Fioricet for breakthrough headaches, however reports that when he takes Fioricet in the evening he wakes up in the middle night with nightmares  Regarding elevation of blood pressure without the diagnosis of hypertension, he is currently having a headache right now which is likely attributing to his elevation of blood pressure  In reviewing previous appointments where he was not having a headache, blood pressure was stable and within normal range  He checks his blood pressure regularly at home and reports average systolic blood pressure readings of 110-130 mm Hg and diastolic pressures ranging between 70-90 mm Hg  Migraine    This is a chronic problem  The current episode started more than 1 year ago  Episode frequency: Every 2-3 days  The problem has been unchanged  The pain is located in the bilateral and frontal region  The pain does not radiate  The quality of the pain is described as pulsating and throbbing  The pain is at a severity of 7/10  The pain is moderate  Associated symptoms include nausea, phonophobia, photophobia and tinnitus (Right ear)   Pertinent negatives include no abdominal pain, abnormal behavior, anorexia, back pain, blurred vision, coughing, dizziness, drainage, ear pain, eye pain, eye redness, eye watering, facial sweating, fever, hearing loss, insomnia, loss of balance, muscle aches, neck pain, numbness, rhinorrhea, scalp tenderness, seizures, sinus pressure, sore throat, swollen glands, tingling, visual change, vomiting, weakness or weight loss  Exacerbated by: petroleum, perfumes  He has tried triptans and NSAIDs (Topamax) for the symptoms  The treatment provided mild relief  His past medical history is significant for migraine headaches  Heartburn   He complains of nausea  He reports no abdominal pain, no chest pain, no choking, no coughing, no heartburn, no sore throat or no wheezing  This is a chronic problem  The current episode started more than 1 year ago  The problem occurs rarely  Pertinent negatives include no fatigue or weight loss  He has tried a PPI for the symptoms  The following portions of the patient's history were reviewed and updated as appropriate: allergies, current medications, past family history, past medical history, past social history, past surgical history and problem list     Review of Systems   Constitutional: Negative for activity change, appetite change, chills, diaphoresis, fatigue, fever and weight loss  HENT: Positive for tinnitus (Right ear)  Negative for congestion, ear pain, hearing loss, postnasal drip, rhinorrhea, sinus pain, sinus pressure, sneezing and sore throat  Eyes: Positive for photophobia  Negative for blurred vision, pain, redness and visual disturbance  Respiratory: Negative for apnea, cough, choking, chest tightness, shortness of breath and wheezing  Cardiovascular: Negative for chest pain, palpitations and leg swelling  Gastrointestinal: Positive for nausea   Negative for abdominal distention, abdominal pain, anal bleeding, anorexia, blood in stool, constipation, diarrhea, heartburn and vomiting  Endocrine: Negative for cold intolerance and heat intolerance  Genitourinary: Negative for difficulty urinating, dysuria and hematuria  Musculoskeletal: Negative  Negative for back pain and neck pain  Skin: Negative  Neurological: Negative for dizziness, tingling, seizures, weakness, light-headedness, numbness, headaches and loss of balance  Hematological: Negative for adenopathy  Psychiatric/Behavioral: Negative for agitation, sleep disturbance and suicidal ideas  The patient does not have insomnia  All other systems reviewed and are negative          Past Medical History:   Diagnosis Date    Asthma     Benign prostatic hyperplasia     Blood transfusion reaction     Bronchitis 04/30/2018    Cardiac arrest ([facility] )     oct 2000    Diverticulosis     GERD (gastroesophageal reflux disease)     Hepatitis A     History of colonic polyps     Internal bleeding     2000    Migraine     Osteoarthritis     Seizures ([facility] )     Stroke ([place] [address] )     2001, 2003         Current Outpatient Prescriptions:     ALPRAZolam (XANAX) 1 mg tablet, Take 1 tablet (1 mg total) by mouth daily at bedtime as needed for sleep, Disp: 90 tablet, Rfl: 0    aspirin 81 MG tablet, Take 81 mg by mouth daily  , Disp: , Rfl:     aspirin-acetaminophen-caffeine (EXCEDRIN MIGRAINE) 250-250-65 MG per tablet, Take 1 tablet by mouth every 6 (six) hours as needed for headaches  , Disp: , Rfl:     b complex vitamins capsule, Take 1 capsule by mouth daily, Disp: , Rfl:     Fenoprofen Calcium 400 MG CAPS, Take 1 capsule by mouth daily, Disp: , Rfl:     fluticasone (FLOVENT HFA) 220 mcg/act inhaler, Inhale 1 puff 2 (two) times a day (Patient taking differently: Inhale 1 puff 2 (two) times a day as needed  ), Disp: 3 Inhaler, Rfl: 3    ibuprofen (MOTRIN) 800 mg tablet, Take 1 tablet (800 mg total) by mouth every 12 (twelve) hours as needed for mild pain, Disp: 30 tablet, Rfl: 0    Multiple Vitamins-Minerals (MULTIVITAMIN WITH MINERALS) tablet, Take 1 tablet by mouth daily, Disp: , Rfl:     ondansetron (ZOFRAN-ODT) 4 mg disintegrating tablet, Take 1 tablet (4 mg total) by mouth every 6 (six) hours as needed for nausea or vomiting, Disp: 30 tablet, Rfl: 0    Potassium 95 MG TABS, Take 1 tablet by mouth daily, Disp: , Rfl:     cyclobenzaprine (FLEXERIL) 5 mg tablet, Take 1 tablet (5 mg total) by mouth daily at bedtime as needed for muscle spasms (Patient not taking: Reported on 11/16/2018 ), Disp: 30 tablet, Rfl: 0    pantoprazole (PROTONIX) 40 mg tablet, Take 1 tablet (40 mg total) by mouth daily for 90 days, Disp: 90 tablet, Rfl: 3    PARoxetine (PAXIL) 40 MG tablet, Take 1 tablet (40 mg total) by mouth every morning, Disp: 90 tablet, Rfl: 1    rizatriptan (MAXALT) 10 MG tablet, Take 1 tablet (10 mg total) by mouth once as needed for migraine for up to 1 dose May repeat in 2 hours if needed, Disp: 90 tablet, Rfl: 0    Allergies   Allergen Reactions    Dust Mite Extract Shortness Of Breath and Allergic Rhinitis    Iodinated Diagnostic Agents Anaphylaxis    Pollen Extract Shortness Of Breath and Allergic Rhinitis    Other      Annotation - [id number]: petrolium derivatives, and perfume    Chlorzoxazone Rash    Codeine Itching and Rash       Social History   Past Surgical History:   Procedure Laterality Date    APPENDECTOMY      DENTAL SURGERY      wisdom tooth extraction    HEMORRHOID SURGERY      MYRINGOTOMY W/ TUBES      HI COLONOSCOPY FLX DX W/COLLJ SPEC WHEN PFRMD N/A 7/7/2017    Procedure: COLONOSCOPY;  Surgeon: Devi Ramirez MD;  [facility]; Service: Colorectal    HI ESOPHAGOGASTRODUODENOSCOPY TRANSORAL DIAGNOSTIC N/A 8/2/2018    Procedure: ESOPHAGOGASTRODUODENOSCOPY (EGD); Surgeon: Dana Warner MD;  [facility];   Service: Gastroenterology    PROSTATE SURGERY      TONSILLECTOMY      VASECTOMY       Family History   Problem Relation Age of Onset    Cancer Father         colon    Diabetes Father     Cancer Brother         colo-rectal, liver    Diabetes Mother     Lupus Sister         systemic-erythematosus    Diabetes Family     Kidney disease Family        Objective:  /85 (BP Location: Left arm, Patient Position: Sitting, Cuff Size: Standard)   Pulse 77   Temp 98 3 °F (36 8 °C) (Tympanic)   SpO2 97%     No results found for this or any previous visit (from the past 1344 hour(s))  Physical Exam   Constitutional: He is oriented to person, place, and time  He appears well-developed and well-nourished  No distress  HENT:   Head: Normocephalic and atraumatic  Eyes: Pupils are equal, round, and reactive to light  Conjunctivae and EOM are normal  Right eye exhibits no discharge  Left eye exhibits no discharge  No scleral icterus  Neck: Normal range of motion  Neck supple  No JVD present  No thyromegaly present  Cardiovascular: Normal rate, regular rhythm, normal heart sounds and intact distal pulses  Exam reveals no gallop and no friction rub  No murmur heard  Pulmonary/Chest: Effort normal and breath sounds normal  No respiratory distress  He has no wheezes  He has no rales  He exhibits no tenderness  Abdominal: Soft  Bowel sounds are normal  He exhibits no distension and no mass  There is no tenderness  There is no rebound and no guarding  Musculoskeletal: Normal range of motion  He exhibits no edema, tenderness or deformity  Lymphadenopathy:     He has no cervical adenopathy  Neurological: He is alert and oriented to person, place, and time  He has normal reflexes  No cranial nerve deficit  Coordination normal    Skin: Skin is warm and dry  No rash noted  He is not diaphoretic  No erythema  No pallor  Psychiatric: He has a normal mood and affect  His behavior is normal  Judgment and thought content normal    Nursing note and vitals reviewed

## 2018-11-17 DIAGNOSIS — G43.009 MIGRAINE WITHOUT AURA AND WITHOUT STATUS MIGRAINOSUS, NOT INTRACTABLE: ICD-10-CM

## 2018-11-17 RX ORDER — RIZATRIPTAN BENZOATE 10 MG/1
10 TABLET ORAL ONCE AS NEEDED
Qty: 90 TABLET | Refills: 0 | Status: CANCELLED | OUTPATIENT
Start: 2018-11-17

## 2018-11-20 ENCOUNTER — TELEPHONE (OUTPATIENT)
Dept: NEUROLOGY | Facility: CLINIC | Age: 68
End: 2018-11-20

## 2018-11-26 DIAGNOSIS — G43.009 MIGRAINE WITHOUT AURA AND WITHOUT STATUS MIGRAINOSUS, NOT INTRACTABLE: ICD-10-CM

## 2018-11-26 NOTE — TELEPHONE ENCOUNTER
Called and canceled current Rx sent to [facility] [address] for the #90 tablets  Please resend Rx with corrected instructions and quantity to [facility] in [facility]  Thank you!

## 2018-11-26 NOTE — TELEPHONE ENCOUNTER
Pt called wanting to know if we can do a quantity override with his insurance company for his Maxalt Rx  The insurance only allows a quantity of #12 tablets per month  Pt is leaving the country for more than a 6M period and needs the medication to last  Dr Hector Wheelerr order a quantity of #90 tablets which the insurance will not fill without prior approval      Insurance company can be reached at [phone]

## 2018-11-26 NOTE — TELEPHONE ENCOUNTER
Insurance company states pt will need a prior auth for the mediation and then a vacation override in order to receive the medication at a local pharmacy  Prior Auth completed over-the-phone  Quantity allowed per month is #27 tablets with a max of 3 tablet per day  Vacation override allows for #81 tablets every 75 days

## 2018-11-27 RX ORDER — RIZATRIPTAN BENZOATE 10 MG/1
TABLET ORAL
Qty: 81 TABLET | Refills: 0 | Status: SHIPPED | OUTPATIENT
Start: 2018-11-27 | End: 2019-05-13 | Stop reason: SDUPTHER

## 2018-11-28 DIAGNOSIS — K21.9 GERD WITHOUT ESOPHAGITIS: ICD-10-CM

## 2018-11-29 RX ORDER — PANTOPRAZOLE SODIUM 40 MG/1
40 TABLET, DELAYED RELEASE ORAL DAILY
Qty: 90 TABLET | Refills: 2 | Status: SHIPPED | OUTPATIENT
Start: 2018-11-29 | End: 2019-05-13

## 2019-05-02 ENCOUNTER — APPOINTMENT (OUTPATIENT)
Dept: LAB | Facility: CLINIC | Age: 69
End: 2019-05-02
Payer: MEDICARE

## 2019-05-02 DIAGNOSIS — F32.A DEPRESSION, UNSPECIFIED DEPRESSION TYPE: ICD-10-CM

## 2019-05-02 DIAGNOSIS — G43.009 MIGRAINE WITHOUT AURA AND WITHOUT STATUS MIGRAINOSUS, NOT INTRACTABLE: ICD-10-CM

## 2019-05-02 DIAGNOSIS — F41.9 ANXIETY: ICD-10-CM

## 2019-05-02 DIAGNOSIS — N40.1 BENIGN PROSTATIC HYPERPLASIA WITH LOWER URINARY TRACT SYMPTOMS, SYMPTOM DETAILS UNSPECIFIED: ICD-10-CM

## 2019-05-02 DIAGNOSIS — R03.0 ELEVATED BP WITHOUT DIAGNOSIS OF HYPERTENSION: ICD-10-CM

## 2019-05-02 DIAGNOSIS — J45.20 MILD INTERMITTENT ASTHMA WITHOUT COMPLICATION: ICD-10-CM

## 2019-05-02 LAB
25(OH)D3 SERPL-MCNC: 25.9 NG/ML (ref 30–100)
ALBUMIN SERPL BCP-MCNC: 3.9 G/DL (ref 3.5–5)
ALP SERPL-CCNC: 44 U/L (ref 46–116)
ALT SERPL W P-5'-P-CCNC: 50 U/L (ref 12–78)
ANION GAP SERPL CALCULATED.3IONS-SCNC: 10 MMOL/L (ref 4–13)
AST SERPL W P-5'-P-CCNC: 24 U/L (ref 5–45)
BACTERIA UR QL AUTO: ABNORMAL /HPF
BILIRUB SERPL-MCNC: 0.5 MG/DL (ref 0.2–1)
BILIRUB UR QL STRIP: NEGATIVE
BUN SERPL-MCNC: 14 MG/DL (ref 5–25)
CALCIUM SERPL-MCNC: 8.4 MG/DL (ref 8.3–10.1)
CHLORIDE SERPL-SCNC: 105 MMOL/L (ref 100–108)
CHOLEST SERPL-MCNC: 176 MG/DL (ref 50–200)
CLARITY UR: CLEAR
CO2 SERPL-SCNC: 26 MMOL/L (ref 21–32)
COLOR UR: YELLOW
CREAT SERPL-MCNC: 1.01 MG/DL (ref 0.6–1.3)
CRP SERPL QL: 3.9 MG/L
ERYTHROCYTE [DISTWIDTH] IN BLOOD BY AUTOMATED COUNT: 12 % (ref 11.6–15.1)
ERYTHROCYTE [SEDIMENTATION RATE] IN BLOOD: 3 MM/HOUR (ref 0–10)
GFR SERPL CREATININE-BSD FRML MDRD: 75 ML/MIN/1.73SQ M
GLUCOSE P FAST SERPL-MCNC: 104 MG/DL (ref 65–99)
GLUCOSE UR STRIP-MCNC: NEGATIVE MG/DL
HCT VFR BLD AUTO: 47 % (ref 36.5–49.3)
HDLC SERPL-MCNC: 50 MG/DL (ref 40–60)
HGB BLD-MCNC: 15.9 G/DL (ref 12–17)
HGB UR QL STRIP.AUTO: ABNORMAL
KETONES UR STRIP-MCNC: NEGATIVE MG/DL
LDLC SERPL CALC-MCNC: 100 MG/DL (ref 0–100)
LEUKOCYTE ESTERASE UR QL STRIP: NEGATIVE
MCH RBC QN AUTO: 30.5 PG (ref 26.8–34.3)
MCHC RBC AUTO-ENTMCNC: 33.8 G/DL (ref 31.4–37.4)
MCV RBC AUTO: 90 FL (ref 82–98)
NITRITE UR QL STRIP: NEGATIVE
NON-SQ EPI CELLS URNS QL MICRO: ABNORMAL /HPF
NONHDLC SERPL-MCNC: 126 MG/DL
PH UR STRIP.AUTO: 6.5 [PH]
PLATELET # BLD AUTO: 175 THOUSANDS/UL (ref 149–390)
PMV BLD AUTO: 9.7 FL (ref 8.9–12.7)
POTASSIUM SERPL-SCNC: 4.6 MMOL/L (ref 3.5–5.3)
PROT SERPL-MCNC: 7 G/DL (ref 6.4–8.2)
PROT UR STRIP-MCNC: NEGATIVE MG/DL
PSA SERPL-MCNC: 0.9 NG/ML (ref 0–4)
RBC # BLD AUTO: 5.22 MILLION/UL (ref 3.88–5.62)
RBC #/AREA URNS AUTO: ABNORMAL /HPF
SODIUM SERPL-SCNC: 141 MMOL/L (ref 136–145)
SP GR UR STRIP.AUTO: 1.01 (ref 1–1.03)
TRIGL SERPL-MCNC: 132 MG/DL
TSH SERPL DL<=0.05 MIU/L-ACNC: 3.73 UIU/ML (ref 0.36–3.74)
UROBILINOGEN UR QL STRIP.AUTO: 0.2 E.U./DL
VIT B12 SERPL-MCNC: 738 PG/ML (ref 100–900)
WBC # BLD AUTO: 5.51 THOUSAND/UL (ref 4.31–10.16)
WBC #/AREA URNS AUTO: ABNORMAL /HPF

## 2019-05-02 PROCEDURE — 86140 C-REACTIVE PROTEIN: CPT

## 2019-05-02 PROCEDURE — 84207 ASSAY OF VITAMIN B-6: CPT

## 2019-05-02 PROCEDURE — 85652 RBC SED RATE AUTOMATED: CPT

## 2019-05-02 PROCEDURE — 82607 VITAMIN B-12: CPT

## 2019-05-02 PROCEDURE — 84153 ASSAY OF PSA TOTAL: CPT

## 2019-05-02 PROCEDURE — 36415 COLL VENOUS BLD VENIPUNCTURE: CPT

## 2019-05-02 PROCEDURE — 85027 COMPLETE CBC AUTOMATED: CPT

## 2019-05-02 PROCEDURE — 80061 LIPID PANEL: CPT

## 2019-05-02 PROCEDURE — 84443 ASSAY THYROID STIM HORMONE: CPT

## 2019-05-02 PROCEDURE — 81001 URINALYSIS AUTO W/SCOPE: CPT

## 2019-05-02 PROCEDURE — 80053 COMPREHEN METABOLIC PANEL: CPT

## 2019-05-02 PROCEDURE — 82306 VITAMIN D 25 HYDROXY: CPT

## 2019-05-05 LAB — VIT B6 SERPL-MCNC: 17.1 UG/L (ref 5.3–46.7)

## 2019-05-08 RX ORDER — OMEPRAZOLE 20 MG/1
CAPSULE, DELAYED RELEASE ORAL
COMMUNITY
Start: 2019-04-02 | End: 2019-05-13 | Stop reason: SDUPTHER

## 2019-05-09 ENCOUNTER — OFFICE VISIT (OUTPATIENT)
Dept: UROLOGY | Facility: CLINIC | Age: 69
End: 2019-05-09
Payer: MEDICARE

## 2019-05-09 VITALS
WEIGHT: 176 LBS | HEIGHT: 67 IN | BODY MASS INDEX: 27.62 KG/M2 | SYSTOLIC BLOOD PRESSURE: 132 MMHG | HEART RATE: 94 BPM | DIASTOLIC BLOOD PRESSURE: 84 MMHG

## 2019-05-09 DIAGNOSIS — N20.0 NEPHROLITHIASIS: Primary | ICD-10-CM

## 2019-05-09 DIAGNOSIS — N40.1 BENIGN PROSTATIC HYPERPLASIA WITH LOWER URINARY TRACT SYMPTOMS, SYMPTOM DETAILS UNSPECIFIED: ICD-10-CM

## 2019-05-09 DIAGNOSIS — R31.29 MICROSCOPIC HEMATURIA: ICD-10-CM

## 2019-05-09 PROCEDURE — 99214 OFFICE O/P EST MOD 30 MIN: CPT | Performed by: UROLOGY

## 2019-05-09 RX ORDER — CIPROFLOXACIN 500 MG/1
500 TABLET, FILM COATED ORAL 2 TIMES DAILY
Qty: 14 TABLET | Refills: 0 | Status: SHIPPED | OUTPATIENT
Start: 2019-05-09 | End: 2019-05-16

## 2019-05-13 ENCOUNTER — OFFICE VISIT (OUTPATIENT)
Dept: INTERNAL MEDICINE CLINIC | Facility: CLINIC | Age: 69
End: 2019-05-13
Payer: MEDICARE

## 2019-05-13 VITALS
SYSTOLIC BLOOD PRESSURE: 150 MMHG | HEART RATE: 84 BPM | DIASTOLIC BLOOD PRESSURE: 90 MMHG | OXYGEN SATURATION: 97 % | TEMPERATURE: 98.2 F

## 2019-05-13 DIAGNOSIS — M54.50 CHRONIC LEFT-SIDED LOW BACK PAIN WITHOUT SCIATICA: ICD-10-CM

## 2019-05-13 DIAGNOSIS — J30.1 SEASONAL ALLERGIC RHINITIS DUE TO POLLEN: ICD-10-CM

## 2019-05-13 DIAGNOSIS — H60.551 ACUTE REACTIVE OTITIS EXTERNA OF RIGHT EAR: ICD-10-CM

## 2019-05-13 DIAGNOSIS — J45.20 MILD INTERMITTENT ASTHMA WITHOUT COMPLICATION: ICD-10-CM

## 2019-05-13 DIAGNOSIS — G89.29 CHRONIC LEFT SHOULDER PAIN: ICD-10-CM

## 2019-05-13 DIAGNOSIS — R03.0 ELEVATED BP WITHOUT DIAGNOSIS OF HYPERTENSION: ICD-10-CM

## 2019-05-13 DIAGNOSIS — G89.29 CHRONIC LEFT-SIDED LOW BACK PAIN WITHOUT SCIATICA: ICD-10-CM

## 2019-05-13 DIAGNOSIS — K21.9 GERD WITHOUT ESOPHAGITIS: ICD-10-CM

## 2019-05-13 DIAGNOSIS — M25.512 CHRONIC LEFT SHOULDER PAIN: ICD-10-CM

## 2019-05-13 DIAGNOSIS — G43.009 MIGRAINE WITHOUT AURA AND WITHOUT STATUS MIGRAINOSUS, NOT INTRACTABLE: ICD-10-CM

## 2019-05-13 DIAGNOSIS — Z23 NEED FOR DIPHTHERIA-TETANUS-PERTUSSIS (TDAP) VACCINE: Primary | ICD-10-CM

## 2019-05-13 DIAGNOSIS — Z23 NEED FOR PNEUMOCOCCAL VACCINATION: ICD-10-CM

## 2019-05-13 DIAGNOSIS — L29.9 PRURITIC DERMATITIS: ICD-10-CM

## 2019-05-13 PROBLEM — F32.A DEPRESSED: Status: RESOLVED | Noted: 2018-08-14 | Resolved: 2019-05-13

## 2019-05-13 PROBLEM — F41.9 ANXIETY: Status: RESOLVED | Noted: 2018-08-03 | Resolved: 2019-05-13

## 2019-05-13 PROCEDURE — 99214 OFFICE O/P EST MOD 30 MIN: CPT | Performed by: INTERNAL MEDICINE

## 2019-05-13 RX ORDER — IBUPROFEN 800 MG/1
800 TABLET ORAL EVERY 12 HOURS PRN
Qty: 180 TABLET | Refills: 1 | Status: SHIPPED | OUTPATIENT
Start: 2019-05-13 | End: 2020-09-23 | Stop reason: SDUPTHER

## 2019-05-13 RX ORDER — METHYLPREDNISOLONE 4 MG/1
TABLET ORAL
Qty: 21 EACH | Refills: 0 | Status: SHIPPED | OUTPATIENT
Start: 2019-05-13 | End: 2020-09-23

## 2019-05-13 RX ORDER — TRAMADOL HYDROCHLORIDE 50 MG/1
50 TABLET ORAL EVERY 12 HOURS PRN
Qty: 60 TABLET | Refills: 0 | Status: SHIPPED | OUTPATIENT
Start: 2019-05-13

## 2019-05-13 RX ORDER — HYDROXYZINE HYDROCHLORIDE 25 MG/1
25 TABLET, FILM COATED ORAL EVERY 6 HOURS PRN
Qty: 90 TABLET | Refills: 1 | Status: SHIPPED | OUTPATIENT
Start: 2019-05-13 | End: 2020-09-23

## 2019-05-13 RX ORDER — CIPROFLOXACIN AND DEXAMETHASONE 3; 1 MG/ML; MG/ML
4 SUSPENSION/ DROPS AURICULAR (OTIC) 2 TIMES DAILY
Qty: 7.5 ML | Refills: 0 | Status: SHIPPED | OUTPATIENT
Start: 2019-05-13 | End: 2019-05-16

## 2019-05-13 RX ORDER — RIZATRIPTAN BENZOATE 10 MG/1
TABLET ORAL
Qty: 90 TABLET | Refills: 1 | Status: SHIPPED | OUTPATIENT
Start: 2019-05-13 | End: 2020-09-23 | Stop reason: SDUPTHER

## 2019-05-13 RX ORDER — OMEPRAZOLE 20 MG/1
20 CAPSULE, DELAYED RELEASE ORAL DAILY
Qty: 90 CAPSULE | Refills: 1 | Status: SHIPPED | OUTPATIENT
Start: 2019-05-13 | End: 2020-09-23 | Stop reason: SDUPTHER

## 2019-05-14 ENCOUNTER — PROCEDURE VISIT (OUTPATIENT)
Dept: INTERNAL MEDICINE CLINIC | Facility: CLINIC | Age: 69
End: 2019-05-14
Payer: MEDICARE

## 2019-05-14 VITALS
SYSTOLIC BLOOD PRESSURE: 142 MMHG | OXYGEN SATURATION: 98 % | HEART RATE: 90 BPM | DIASTOLIC BLOOD PRESSURE: 92 MMHG | TEMPERATURE: 98.4 F

## 2019-05-14 DIAGNOSIS — Z23 NEED FOR DIPHTHERIA-TETANUS-PERTUSSIS (TDAP) VACCINE: Primary | ICD-10-CM

## 2019-05-14 DIAGNOSIS — L82.1 SEBORRHEIC KERATOSES: ICD-10-CM

## 2019-05-14 DIAGNOSIS — D22.4 ATYPICAL NEVUS OF SCALP: ICD-10-CM

## 2019-05-14 DIAGNOSIS — Z23 NEED FOR PNEUMOCOCCAL VACCINE: ICD-10-CM

## 2019-05-14 PROCEDURE — 11306 SHAVE SKIN LESION 0.6-1.0 CM: CPT | Performed by: FAMILY MEDICINE

## 2019-05-14 PROCEDURE — 88305 TISSUE EXAM BY PATHOLOGIST: CPT | Performed by: PATHOLOGY

## 2019-05-15 ENCOUNTER — CLINICAL SUPPORT (OUTPATIENT)
Dept: INTERNAL MEDICINE CLINIC | Facility: CLINIC | Age: 69
End: 2019-05-15
Payer: MEDICARE

## 2019-05-15 DIAGNOSIS — Z23 NEED FOR PNEUMOCOCCAL VACCINE: Primary | ICD-10-CM

## 2019-05-15 PROCEDURE — 90670 PCV13 VACCINE IM: CPT

## 2019-05-15 PROCEDURE — G0009 ADMIN PNEUMOCOCCAL VACCINE: HCPCS

## 2019-05-16 ENCOUNTER — TELEPHONE (OUTPATIENT)
Dept: INTERNAL MEDICINE CLINIC | Facility: CLINIC | Age: 69
End: 2019-05-16

## 2019-05-16 DIAGNOSIS — H60.551 ACUTE REACTIVE OTITIS EXTERNA OF RIGHT EAR: Primary | ICD-10-CM

## 2019-05-17 PROBLEM — L82.1 SEBORRHEIC KERATOSES: Status: ACTIVE | Noted: 2019-05-17

## 2019-12-10 ENCOUNTER — PATIENT MESSAGE (OUTPATIENT)
Dept: INTERNAL MEDICINE CLINIC | Facility: CLINIC | Age: 69
End: 2019-12-10

## 2020-03-11 ENCOUNTER — TELEPHONE (OUTPATIENT)
Dept: INTERNAL MEDICINE CLINIC | Facility: CLINIC | Age: 70
End: 2020-03-11

## 2020-03-11 NOTE — TELEPHONE ENCOUNTER
Per sister Meghann Santos, pt is out of the country & sje is unsure as to when he will be back  He will call to make appt upon return

## 2020-04-04 DIAGNOSIS — K21.9 GERD WITHOUT ESOPHAGITIS: ICD-10-CM

## 2020-04-06 RX ORDER — PANTOPRAZOLE SODIUM 40 MG/1
TABLET, DELAYED RELEASE ORAL
Qty: 90 TABLET | Refills: 0 | Status: SHIPPED | OUTPATIENT
Start: 2020-04-06 | End: 2020-09-23

## 2020-09-23 ENCOUNTER — TELEMEDICINE (OUTPATIENT)
Dept: INTERNAL MEDICINE CLINIC | Facility: CLINIC | Age: 70
End: 2020-09-23
Payer: MEDICARE

## 2020-09-23 VITALS
BODY MASS INDEX: 24.48 KG/M2 | WEIGHT: 156 LBS | HEART RATE: 89 BPM | DIASTOLIC BLOOD PRESSURE: 76 MMHG | TEMPERATURE: 97.5 F | SYSTOLIC BLOOD PRESSURE: 116 MMHG | HEIGHT: 67 IN

## 2020-09-23 DIAGNOSIS — Z12.11 SCREENING FOR COLORECTAL CANCER: ICD-10-CM

## 2020-09-23 DIAGNOSIS — G89.29 CHRONIC LEFT SHOULDER PAIN: ICD-10-CM

## 2020-09-23 DIAGNOSIS — J30.1 SEASONAL ALLERGIC RHINITIS DUE TO POLLEN: Primary | ICD-10-CM

## 2020-09-23 DIAGNOSIS — M54.50 CHRONIC LEFT-SIDED LOW BACK PAIN WITHOUT SCIATICA: ICD-10-CM

## 2020-09-23 DIAGNOSIS — N40.1 BENIGN PROSTATIC HYPERPLASIA WITH LOWER URINARY TRACT SYMPTOMS, SYMPTOM DETAILS UNSPECIFIED: ICD-10-CM

## 2020-09-23 DIAGNOSIS — Z12.12 SCREENING FOR COLORECTAL CANCER: ICD-10-CM

## 2020-09-23 DIAGNOSIS — G47.00 INSOMNIA, UNSPECIFIED TYPE: ICD-10-CM

## 2020-09-23 DIAGNOSIS — G89.29 CHRONIC LEFT-SIDED LOW BACK PAIN WITHOUT SCIATICA: ICD-10-CM

## 2020-09-23 DIAGNOSIS — G43.009 MIGRAINE WITHOUT AURA AND WITHOUT STATUS MIGRAINOSUS, NOT INTRACTABLE: ICD-10-CM

## 2020-09-23 DIAGNOSIS — J45.20 MILD INTERMITTENT ASTHMA WITHOUT COMPLICATION: ICD-10-CM

## 2020-09-23 DIAGNOSIS — K21.9 GERD WITHOUT ESOPHAGITIS: ICD-10-CM

## 2020-09-23 DIAGNOSIS — M25.512 CHRONIC LEFT SHOULDER PAIN: ICD-10-CM

## 2020-09-23 DIAGNOSIS — Z00.00 MEDICARE ANNUAL WELLNESS VISIT, SUBSEQUENT: ICD-10-CM

## 2020-09-23 DIAGNOSIS — Z13.1 SCREENING FOR DIABETES MELLITUS: ICD-10-CM

## 2020-09-23 DIAGNOSIS — Z13.6 SCREENING FOR CARDIOVASCULAR CONDITION: ICD-10-CM

## 2020-09-23 DIAGNOSIS — Z12.5 SCREENING FOR PROSTATE CANCER: ICD-10-CM

## 2020-09-23 PROBLEM — H60.551 ACUTE REACTIVE OTITIS EXTERNA OF RIGHT EAR: Status: RESOLVED | Noted: 2019-05-13 | Resolved: 2020-09-23

## 2020-09-23 PROBLEM — G44.209 MUSCLE TENSION HEADACHE: Status: RESOLVED | Noted: 2018-05-16 | Resolved: 2020-09-23

## 2020-09-23 PROBLEM — R03.0 ELEVATED BP WITHOUT DIAGNOSIS OF HYPERTENSION: Status: RESOLVED | Noted: 2018-07-30 | Resolved: 2020-09-23

## 2020-09-23 PROBLEM — L29.9 PRURITIC DERMATITIS: Status: RESOLVED | Noted: 2019-05-13 | Resolved: 2020-09-23

## 2020-09-23 PROCEDURE — G0438 PPPS, INITIAL VISIT: HCPCS | Performed by: INTERNAL MEDICINE

## 2020-09-23 PROCEDURE — 99214 OFFICE O/P EST MOD 30 MIN: CPT | Performed by: INTERNAL MEDICINE

## 2020-09-23 RX ORDER — IBUPROFEN 800 MG/1
800 TABLET ORAL EVERY 12 HOURS PRN
Qty: 180 TABLET | Refills: 1 | Status: SHIPPED | OUTPATIENT
Start: 2020-09-23 | End: 2021-03-09

## 2020-09-23 RX ORDER — OMEPRAZOLE 20 MG/1
20 CAPSULE, DELAYED RELEASE ORAL DAILY
Qty: 90 CAPSULE | Refills: 1 | Status: SHIPPED | OUTPATIENT
Start: 2020-09-23

## 2020-09-23 RX ORDER — ALPRAZOLAM 1 MG/1
1 TABLET ORAL
Qty: 90 TABLET | Refills: 0 | Status: SHIPPED | OUTPATIENT
Start: 2020-09-23

## 2020-09-23 RX ORDER — CYCLOBENZAPRINE HCL 5 MG
5 TABLET ORAL
Qty: 90 TABLET | Refills: 1 | Status: SHIPPED | OUTPATIENT
Start: 2020-09-23 | End: 2020-10-26 | Stop reason: SDUPTHER

## 2020-09-23 RX ORDER — RIZATRIPTAN BENZOATE 10 MG/1
TABLET ORAL
Qty: 90 TABLET | Refills: 1 | Status: SHIPPED | OUTPATIENT
Start: 2020-09-23

## 2020-09-23 NOTE — PROGRESS NOTES
Virtual Regular Visit      Assessment/Plan:    Problem List Items Addressed This Visit        Digestive    GERD without esophagitis     Controlled  Continue omeprazole  Relevant Medications    omeprazole (PriLOSEC) 20 mg delayed release capsule    Other Relevant Orders    CBC    Comprehensive metabolic panel       Respiratory    Seasonal allergic rhinitis due to pollen - Primary     Continue over-the-counter antihistamines and fluticasone as needed  Relevant Medications    ibuprofen (MOTRIN) 800 mg tablet    Mild intermittent asthma without complication     Stable  No recent exacerbations  Cardiovascular and Mediastinum    Migraine without aura and without status migrainosus, not intractable     Continue follow-up with Neurology  Continue Excedrin migraine for mild headaches and Maxalt for moderate to severe headaches  No longer on Paxil for preventative therapy  Relevant Medications    ibuprofen (MOTRIN) 800 mg tablet    rizatriptan (MAXALT) 10 MG tablet    cyclobenzaprine (FLEXERIL) 5 mg tablet    Other Relevant Orders    CBC    Comprehensive metabolic panel       Genitourinary    Benign prostatic hyperplasia with lower urinary tract symptoms     Stable  Continue to monitor clinically  Continue PSA screening  Relevant Orders    PSA, Total Screen       Other    Insomnia     Continue Xanax 1 mg at bedtime as needed for insomnia  Relevant Medications    ALPRAZolam (XANAX) 1 mg tablet    Chronic left shoulder pain    Relevant Medications    ibuprofen (MOTRIN) 800 mg tablet    cyclobenzaprine (FLEXERIL) 5 mg tablet    Chronic left-sided low back pain without sciatica     Continue NSAIDs and muscle relaxers as needed for mild-to-moderate pain and tramadol as needed for moderate to severe pain             Other Visit Diagnoses     Medicare annual wellness visit, subsequent        Screening for diabetes mellitus        Relevant Orders    Comprehensive metabolic panel Screening for cardiovascular condition        Relevant Orders    Comprehensive metabolic panel    Lipid panel    Screening for colorectal cancer        Screening for prostate cancer        Relevant Orders    PSA, Total Screen          BMI Counseling: Body mass index is 24 43 kg/m²  The BMI is above normal  Nutrition recommendations include decreasing portion sizes, encouraging healthy choices of fruits and vegetables, decreasing fast food intake, consuming healthier snacks, limiting drinks that contain sugar, moderation in carbohydrate intake, increasing intake of lean protein, reducing intake of saturated and trans fat and reducing intake of cholesterol  Exercise recommendations include moderate physical activity 150 minutes/week and exercising 3-5 times per week  No pharmacotherapy was ordered  Patient referred to PCP due to patient being overweight  Depression Screening and Follow-up Plan: Patient's depression screening was positive with a PHQ-2 score of 0  Clincally patient does not have depression  No treatment is required  Falls Plan of Care: balance, strength, and gait training instructions were provided  Reason for visit is   Chief Complaint   Patient presents with    Follow-up     Patient is here for f/u chronic conditions  Pt c/o lower back pain on both sides  Pain started getting worse 10 months ago  Pt would like to increase the dose for Xanax 2mg  Pt would like to have a Rx for Ciprofloxacin   Medicare Wellness Visit     [facility]        Encounter provider Lynwood Severin, MD    Provider located at [address] [place] [id number]      Recent Visits  No visits were found meeting these conditions     Showing recent visits within past 7 days and meeting all other requirements     Today's Visits  Date Type Provider Dept   09/23/20 Telemedicine Lynwood Severin, MD [facility]   Showing today's visits and meeting all other requirements     Future Appointments  No visits were found meeting these conditions  Showing future appointments within next 150 days and meeting all other requirements        The patient was identified by name and date of birth  Yesica Clemons was informed that this is a telemedicine visit and that the visit is being conducted through [facility] and patient was informed that this is not a secure, HIPAA-complaint platform  He agrees to proceed     My office door was closed  No one else was in the room  He acknowledged consent and understanding of privacy and security of the video platform  The patient has agreed to participate and understands they can discontinue the visit at any time  Patient is aware this is a billable service  Subjective  Yesica Clemons is a   57-year-old male is seen today for follow-up of chronic conditions  No laboratory studies to review today  He has no active complaints or concerns at this time  He is currently residing in [facility] and plans on returning back to [place] in November  He has been following a healthy diet and exercises regularly with jogging and cycling  He has successfully lost weight with this regimen and feels better with his polyarthralgia  He continues to have arthritic pain of the right hand, digits 2 and 3  His polyarthralgia symptoms are controlled with regimen of NSAIDs and muscle relaxers for mild-to-moderate pain, and tramadol for moderate to severe pain  He continues to have frequent migraine headaches to which he sees a neurologist whenever he is back in this case  Headaches are controlled with Maxalt  Migraine    This is a chronic problem  The current episode started more than 1 year ago  The problem occurs daily  The problem has been unchanged  The pain does not radiate  The pain quality is similar to prior headaches   Pertinent negatives include no abdominal pain, coughing, dizziness, fever, nausea, numbness, rhinorrhea, sinus pressure, sore throat, vomiting or weakness  He has tried acetaminophen, triptans and Excedrin for the symptoms  The treatment provided moderate relief  Arthritis   Presents for follow-up visit  He complains of pain and stiffness  The symptoms have been stable  Pertinent negatives include no diarrhea, dysuria, fatigue or fever  Heartburn   He reports no abdominal pain, no chest pain, no choking, no coughing, no heartburn, no nausea, no sore throat or no wheezing  This is a chronic problem  The current episode started more than 1 year ago  The problem occurs rarely  The problem has been unchanged  Pertinent negatives include no fatigue  He has tried a PPI for the symptoms  The treatment provided moderate relief          Past Medical History:   Diagnosis Date    Acute reactive otitis externa of right ear 5/13/2019    Allergic +25 yrs    Asthma     Benign prostatic hyperplasia     Blood transfusion reaction     Bronchitis 04/30/2018    [facility]     oct 2000    Clotting disorder ([facility] ) 10/2000    treated    Diverticulitis of colon 20+ yrs    Diverticulosis     Elevated BP without diagnosis of hypertension 7/30/2018    GERD (gastroesophageal reflux disease)     Hepatitis A     History of colonic polyps     Internal bleeding     2000    Kidney stone 04/2003    Migraine     Muscle tension headache 5/16/2018    Osteoarthritis     Pruritic dermatitis 5/13/2019    Seizures [facility]     Shingles 25 +yrs    Stroke ([facility] )     2001, 2003    Urinary tract infection     treated    Visual impairment 2002       Past Surgical History:   Procedure Laterality Date    APPENDECTOMY      DENTAL SURGERY      wisdom tooth extraction    HEMORRHOID SURGERY      MYRINGOTOMY W/ TUBES      [place] COLONOSCOPY FLX DX W/COLLJ SPEC WHEN PFRMD N/A 7/7/2017    Procedure: COLONOSCOPY;  Surgeon: Anny Lopez MD;  [facility]; Service: Colorectal    ID ESOPHAGOGASTRODUODENOSCOPY TRANSORAL DIAGNOSTIC N/A 8/2/2018    Procedure: ESOPHAGOGASTRODUODENOSCOPY (EGD); Surgeon: Beryle Homme, MD;  [facility]: [facility]; Service: Gastroenterology    PROSTATE SURGERY      TONSILLECTOMY      VASECTOMY         Current Outpatient Medications   Medication Sig Dispense Refill    ALPRAZolam (XANAX) 1 mg tablet Take 1 tablet (1 mg total) by mouth daily at bedtime as needed for sleep 90 tablet 0    aspirin-acetaminophen-caffeine (EXCEDRIN MIGRAINE) 250-250-65 MG per tablet Take 1 tablet by mouth every 6 (six) hours as needed for headaches        b complex vitamins capsule Take 1 capsule by mouth daily      cyclobenzaprine (FLEXERIL) 5 mg tablet Take 1 tablet (5 mg total) by mouth daily at bedtime as needed for muscle spasms 90 tablet 1    Fenoprofen Calcium 400 MG CAPS Take 1 capsule by mouth daily      fluticasone (FLOVENT HFA) 220 mcg/act inhaler Inhale 1 puff 2 (two) times a day (Patient taking differently: Inhale 1 puff 2 (two) times a day as needed  ) 3 Inhaler 3    ibuprofen (MOTRIN) 800 mg tablet Take 1 tablet (800 mg total) by mouth every 12 (twelve) hours as needed for mild pain 180 tablet 1    Multiple Vitamins-Minerals (MULTIVITAMIN WITH MINERALS) tablet Take 1 tablet by mouth daily      omeprazole (PriLOSEC) 20 mg delayed release capsule Take 1 capsule (20 mg total) by mouth daily 90 capsule 1    Potassium 95 MG TABS Take 1 tablet by mouth daily      rizatriptan (MAXALT) 10 MG tablet Take 1 tablet (10 mg) prn for migraine, may repeat in 2 hrs (Max 3/day) 90 tablet 1    traMADol (ULTRAM) 50 mg tablet Take 1 tablet (50 mg total) by mouth every 12 (twelve) hours as needed for moderate pain or severe pain 60 tablet 0    aspirin 81 MG tablet Take 81 mg by mouth daily         No current facility-administered medications for this visit           Allergies   Allergen Reactions    Dust Mite Extract Shortness Of Breath and Allergic Rhinitis    Iodinated Diagnostic Agents Anaphylaxis    Pollen Extract Shortness Of Breath and Allergic Rhinitis    Other      Annotation - [id number]: petrolium derivatives, and perfume    Chlorzoxazone Rash    Codeine Itching and Rash       Review of Systems   Constitutional: Negative for activity change, appetite change, chills, diaphoresis, fatigue and fever  HENT: Negative for congestion, postnasal drip, rhinorrhea, sinus pressure, sinus pain, sneezing and sore throat  Eyes: Negative for visual disturbance  Respiratory: Negative for apnea, cough, choking, chest tightness, shortness of breath and wheezing  Cardiovascular: Negative for chest pain, palpitations and leg swelling  Gastrointestinal: Negative for abdominal distention, abdominal pain, anal bleeding, blood in stool, constipation, diarrhea, heartburn, nausea and vomiting  Endocrine: Negative for cold intolerance and heat intolerance  Genitourinary: Negative for difficulty urinating, dysuria and hematuria  Musculoskeletal: Positive for arthritis and stiffness  Skin: Negative  Neurological: Positive for headaches  Negative for dizziness, weakness, light-headedness and numbness  Hematological: Negative for adenopathy  Psychiatric/Behavioral: Negative for agitation, sleep disturbance and suicidal ideas  All other systems reviewed and are negative  Video Exam    Vitals:    09/23/20 1404   BP: 116/76   BP Location: Left arm   Patient Position: Sitting   Cuff Size: Standard   Pulse: 89   Temp: 97 5 °F (36 4 °C)   TempSrc: Oral   Weight: 70 8 kg (156 lb)   Height: 5' 7" (1 702 m)       Physical Exam  Vitals signs and nursing note reviewed  Constitutional:       General: He is not in acute distress  Appearance: He is well-developed  He is not diaphoretic  HENT:      Head: Normocephalic and atraumatic        Right Ear: External ear normal       Left Ear: External ear normal    Eyes:      General:         Right eye: No discharge  Left eye: No discharge  Conjunctiva/sclera: Conjunctivae normal    Neck:      Musculoskeletal: Normal range of motion  Pulmonary:      Effort: Pulmonary effort is normal  No respiratory distress  Abdominal:      General: There is no distension  Tenderness: There is no abdominal tenderness  Musculoskeletal: Normal range of motion  General: No deformity  Skin:     Coloration: Skin is not pale  Findings: No erythema or rash  Neurological:      Mental Status: He is alert and oriented to person, place, and time  Cranial Nerves: No cranial nerve deficit  Coordination: Coordination normal    Psychiatric:         Behavior: Behavior normal          Thought Content: Thought content normal          Judgment: Judgment normal           I spent 25 minutes with patient today in which greater than 50% of the time was spent in counseling/coordination of care regarding Counseling, discussing medication regimen, and follow-up plan  VIRTUAL VISIT DISCLAIMER    Cem Napoleonadrian Knottsafia acknowledges that he has consented to an online visit or consultation  He understands that the online visit is based solely on information provided by him, and that, in the absence of a face-to-face physical evaluation by the physician, the diagnosis he receives is both limited and provisional in terms of accuracy and completeness  This is not intended to replace a full medical face-to-face evaluation by the physician  Randy Singh understands and accepts these terms

## 2020-09-23 NOTE — ASSESSMENT & PLAN NOTE
Continue NSAIDs and muscle relaxers as needed for mild-to-moderate pain and tramadol as needed for moderate to severe pain

## 2020-09-23 NOTE — PATIENT INSTRUCTIONS
Medicare Preventive Visit Patient Instructions  Thank you for completing your Welcome to Medicare Visit or Medicare Annual Wellness Visit today  Your next wellness visit will be due in one year (9/23/2021)  The screening/preventive services that you may require over the next 5-10 years are detailed below  Some tests may not apply to you based off risk factors and/or age  Screening tests ordered at today's visit but not completed yet may show as past due  Also, please note that scanned in results may not display below  Preventive Screenings:  Service Recommendations Previous Testing/Comments   Colorectal Cancer Screening  · Colonoscopy    · Fecal Occult Blood Test (FOBT)/Fecal Immunochemical Test (FIT)  · Fecal DNA/Cologuard Test  · Flexible Sigmoidoscopy Age: 54-65 years old   Colonoscopy: every 10 years (May be performed more frequently if at higher risk)  OR  FOBT/FIT: every 1 year  OR  Cologuard: every 3 years  OR  Sigmoidoscopy: every 5 years  Screening may be recommended earlier than age 48 if at higher risk for colorectal cancer  Also, an individualized decision between you and your healthcare provider will decide whether screening between the ages of 74-80 would be appropriate   Colonoscopy: 07/07/2017  FOBT/FIT: Not on file  Cologuard: Not on file  Sigmoidoscopy: Not on file    Screening Current     Prostate Cancer Screening Individualized decision between patient and health care provider in men between ages of 53-78   Medicare will cover every 12 months beginning on the day after your 50th birthday PSA: 0 9 ng/mL          Hepatitis C Screening Once for adults born between 1945 and 1965  More frequently in patients at high risk for Hepatitis C Hep C Antibody: 07/30/2018    Screening Current   Diabetes Screening 1-2 times per year if you're at risk for diabetes or have pre-diabetes Fasting glucose: 104 mg/dL   A1C: No results in last 5 years       Cholesterol Screening Once every 5 years if you don't have a lipid disorder  May order more often based on risk factors  Lipid panel: 05/02/2019    Screening Current      Other Preventive Screenings Covered by Medicare:  1  Abdominal Aortic Aneurysm (AAA) Screening: covered once if your at risk  You're considered to be at risk if you have a family history of AAA or a male between the age of 73-68 who smoking at least 100 cigarettes in your lifetime  2  Lung Cancer Screening: covers low dose CT scan once per year if you meet all of the following conditions: (1) Age 50-69; (2) No signs or symptoms of lung cancer; (3) Current smoker or have quit smoking within the last 15 years; (4) You have a tobacco smoking history of at least 30 pack years (packs per day x number of years you smoked); (5) You get a written order from a healthcare provider  3  Glaucoma Screening: covered annually if you're considered high risk: (1) You have diabetes OR (2) Family history of glaucoma OR (3)  aged 48 and older OR (3)  American aged 72 and older  3  Osteoporosis Screening: covered every 2 years if you meet one of the following conditions: (1) Have a vertebral abnormality; (2) On glucocorticoid therapy for more than 3 months; (3) Have primary hyperparathyroidism; (4) On osteoporosis medications and need to assess response to drug therapy  5  HIV Screening: covered annually if you're between the age of 12-76  Also covered annually if you are younger than 13 and older than 72 with risk factors for HIV infection  For pregnant patients, it is covered up to 3 times per pregnancy      Immunizations:  Immunization Recommendations   Influenza Vaccine Annual influenza vaccination during flu season is recommended for all persons aged >= 6 months who do not have contraindications   Pneumococcal Vaccine (Prevnar and Pneumovax)  * Prevnar = PCV13  * Pneumovax = PPSV23 Adults 25-60 years old: 1-3 doses may be recommended based on certain risk factors  Adults 72 years old: Prevnar (PCV13) vaccine recommended followed by Pneumovax (PPSV23) vaccine  If already received PPSV23 since turning 65, then PCV13 recommended at least one year after PPSV23 dose  Hepatitis B Vaccine 3 dose series if at intermediate or high risk (ex: diabetes, end stage renal disease, liver disease)   Tetanus (Td) Vaccine - COST NOT COVERED BY MEDICARE PART B Following completion of primary series, a booster dose should be given every 10 years to maintain immunity against tetanus  Td may also be given as tetanus wound prophylaxis  Tdap Vaccine - COST NOT COVERED BY MEDICARE PART B Recommended at least once for all adults  For pregnant patients, recommended with each pregnancy  Shingles Vaccine (Shingrix) - COST NOT COVERED BY MEDICARE PART B  2 shot series recommended in those aged 48 and above     Health Maintenance Due:      Topic Date Due    Hepatitis C Screening  Completed     Immunizations Due:      Topic Date Due    DTaP,Tdap,and Td Vaccines (1 - Tdap) 12/08/1969    Pneumococcal Vaccine: 65+ Years (2 of 2 - PPSV23) 05/15/2020    Influenza Vaccine  07/01/2020     Advance Directives   What are advance directives? Advance directives are legal documents that state your wishes and plans for medical care  These plans are made ahead of time in case you lose your ability to make decisions for yourself  Advance directives can apply to any medical decision, such as the treatments you want, and if you want to donate organs  What are the types of advance directives? There are many types of advance directives, and each state has rules about how to use them  You may choose a combination of any of the following:  · Living will: This is a written record of the treatment you want  You can also choose which treatments you do not want, which to limit, and which to stop at a certain time  This includes surgery, medicine, IV fluid, and tube feedings  · Durable power of  for healthcare [facility]):   This is a written record that states who you want to make healthcare choices for you when you are unable to make them for yourself  This person, called a proxy, is usually a family member or a friend  You may choose more than 1 proxy  · Do not resuscitate (DNR) order:  A DNR order is used in case your heart stops beating or you stop breathing  It is a request not to have certain forms of treatment, such as CPR  A DNR order may be included in other types of advance directives  · Medical directive: This covers the care that you want if you are in a coma, near death, or unable to make decisions for yourself  You can list the treatments you want for each condition  Treatment may include pain medicine, surgery, blood transfusions, dialysis, IV or tube feedings, and a ventilator (breathing machine)  · Values history: This document has questions about your views, beliefs, and how you feel and think about life  This information can help others choose the care that you would choose  Why are advance directives important? An advance directive helps you control your care  Although spoken wishes may be used, it is better to have your wishes written down  Spoken wishes can be misunderstood, or not followed  Treatments may be given even if you do not want them  An advance directive may make it easier for your family to make difficult choices about your care  Fall Prevention    Fall prevention  includes ways to make your home and other areas safer  It also includes ways you can move more carefully to prevent a fall  Health conditions that cause changes in your blood pressure, vision, or muscle strength and coordination may increase your risk for falls  Medicines may also increase your risk for falls if they make you dizzy, weak, or sleepy  Fall prevention tips:   · Stand or sit up slowly  · Use assistive devices as directed  · Wear shoes that fit well and have soles that   · Wear a personal alarm  · Stay active  · Manage your medical conditions  Home Safety Tips:  · Add items to prevent falls in the bathroom  · Keep paths clear  · Install bright lights in your home  · Keep items you use often on shelves within reach  · Paint or place reflective tape on the edges of your stairs  © Copyright [employer] 2018 Information is for End User's use only and may not be sold, redistributed or otherwise used for commercial purposes  All illustrations and images included in CareNotes® are the copyrighted property of [employer]  or [facility] Preventive Visit Patient Instructions  Thank you for completing your Welcome to Medicare Visit or Medicare Annual Wellness Visit today  Your next wellness visit will be due in one year (9/23/2021)  The screening/preventive services that you may require over the next 5-10 years are detailed below  Some tests may not apply to you based off risk factors and/or age  Screening tests ordered at today's visit but not completed yet may show as past due  Also, please note that scanned in results may not display below  Preventive Screenings:  Service Recommendations Previous Testing/Comments   Colorectal Cancer Screening  · Colonoscopy    · Fecal Occult Blood Test (FOBT)/Fecal Immunochemical Test (FIT)  · Fecal DNA/Cologuard Test  · Flexible Sigmoidoscopy Age: 54-65 years old   Colonoscopy: every 10 years (May be performed more frequently if at higher risk)  OR  FOBT/FIT: every 1 year  OR  Cologuard: every 3 years  OR  Sigmoidoscopy: every 5 years  Screening may be recommended earlier than age 48 if at higher risk for colorectal cancer  Also, an individualized decision between you and your healthcare provider will decide whether screening between the ages of 74-80 would be appropriate   Colonoscopy: 07/07/2017  FOBT/FIT: Not on file  Cologuard: Not on file  Sigmoidoscopy: Not on file    Screening Current     Prostate Cancer Screening Individualized decision between patient and health care provider in men between ages of 53-78   Medicare will cover every 12 months beginning on the day after your 50th birthday PSA: 0 9 ng/mL          Hepatitis C Screening Once for adults born between 1945 and 1965  More frequently in patients at high risk for Hepatitis C Hep C Antibody: 07/30/2018    Screening Current   Diabetes Screening 1-2 times per year if you're at risk for diabetes or have pre-diabetes Fasting glucose: 104 mg/dL   A1C: No results in last 5 years       Cholesterol Screening Once every 5 years if you don't have a lipid disorder  May order more often based on risk factors  Lipid panel: 05/02/2019    Screening Current      Other Preventive Screenings Covered by Medicare:  6  Abdominal Aortic Aneurysm (AAA) Screening: covered once if your at risk  You're considered to be at risk if you have a family history of AAA or a male between the age of 73-68 who smoking at least 100 cigarettes in your lifetime  7  Lung Cancer Screening: covers low dose CT scan once per year if you meet all of the following conditions: (1) Age 50-69; (2) No signs or symptoms of lung cancer; (3) Current smoker or have quit smoking within the last 15 years; (4) You have a tobacco smoking history of at least 30 pack years (packs per day x number of years you smoked); (5) You get a written order from a healthcare provider  8  Glaucoma Screening: covered annually if you're considered high risk: (1) You have diabetes OR (2) Family history of glaucoma OR (3)  aged 48 and older OR (3)  American aged 72 and older  5  Osteoporosis Screening: covered every 2 years if you meet one of the following conditions: (1) Have a vertebral abnormality; (2) On glucocorticoid therapy for more than 3 months; (3) Have primary hyperparathyroidism; (4) On osteoporosis medications and need to assess response to drug therapy  10  HIV Screening: covered annually if you're between the age of 12-76  Also covered annually if you are younger than 13 and older than 72 with risk factors for HIV infection  For pregnant patients, it is covered up to 3 times per pregnancy  Immunizations:  Immunization Recommendations   Influenza Vaccine Annual influenza vaccination during flu season is recommended for all persons aged >= 6 months who do not have contraindications   Pneumococcal Vaccine (Prevnar and Pneumovax)  * Prevnar = PCV13  * Pneumovax = PPSV23 Adults 25-60 years old: 1-3 doses may be recommended based on certain risk factors  Adults 72 years old: Prevnar (PCV13) vaccine recommended followed by Pneumovax (PPSV23) vaccine  If already received PPSV23 since turning 65, then PCV13 recommended at least one year after PPSV23 dose  Hepatitis B Vaccine 3 dose series if at intermediate or high risk (ex: diabetes, end stage renal disease, liver disease)   Tetanus (Td) Vaccine - COST NOT COVERED BY MEDICARE PART B Following completion of primary series, a booster dose should be given every 10 years to maintain immunity against tetanus  Td may also be given as tetanus wound prophylaxis  Tdap Vaccine - COST NOT COVERED BY MEDICARE PART B Recommended at least once for all adults  For pregnant patients, recommended with each pregnancy  Shingles Vaccine (Shingrix) - COST NOT COVERED BY MEDICARE PART B  2 shot series recommended in those aged 48 and above     Health Maintenance Due:      Topic Date Due    Hepatitis C Screening  Completed     Immunizations Due:      Topic Date Due    DTaP,Tdap,and Td Vaccines (1 - Tdap) 12/08/1969    Pneumococcal Vaccine: 65+ Years (2 of 2 - PPSV23) 05/15/2020    Influenza Vaccine  07/01/2020     Advance Directives   What are advance directives? Advance directives are legal documents that state your wishes and plans for medical care  These plans are made ahead of time in case you lose your ability to make decisions for yourself   Advance directives can apply to any medical decision, such as the treatments you want, and if you want to donate organs  What are the types of advance directives? There are many types of advance directives, and each state has rules about how to use them  You may choose a combination of any of the following:  · Living will: This is a written record of the treatment you want  You can also choose which treatments you do not want, which to limit, and which to stop at a certain time  This includes surgery, medicine, IV fluid, and tube feedings  · Durable power of  for healthcare [facility]): This is a written record that states who you want to make healthcare choices for you when you are unable to make them for yourself  This person, called a proxy, is usually a family member or a friend  You may choose more than 1 proxy  · Do not resuscitate (DNR) order:  A DNR order is used in case your heart stops beating or you stop breathing  It is a request not to have certain forms of treatment, such as CPR  A DNR order may be included in other types of advance directives  · Medical directive: This covers the care that you want if you are in a coma, near death, or unable to make decisions for yourself  You can list the treatments you want for each condition  Treatment may include pain medicine, surgery, blood transfusions, dialysis, IV or tube feedings, and a ventilator (breathing machine)  · Values history: This document has questions about your views, beliefs, and how you feel and think about life  This information can help others choose the care that you would choose  Why are advance directives important? An advance directive helps you control your care  Although spoken wishes may be used, it is better to have your wishes written down  Spoken wishes can be misunderstood, or not followed  Treatments may be given even if you do not want them  An advance directive may make it easier for your family to make difficult choices about your care     Fall Prevention    Fall prevention  includes ways to make your home and other areas safer  It also includes ways you can move more carefully to prevent a fall  Health conditions that cause changes in your blood pressure, vision, or muscle strength and coordination may increase your risk for falls  Medicines may also increase your risk for falls if they make you dizzy, weak, or sleepy  Fall prevention tips:   · Stand or sit up slowly  · Use assistive devices as directed  · Wear shoes that fit well and have soles that   · Wear a personal alarm  · Stay active  · Manage your medical conditions  Home Safety Tips:  · Add items to prevent falls in the bathroom  · Keep paths clear  · Install bright lights in your home  · Keep items you use often on shelves within reach  · Paint or place reflective tape on the edges of your stairs  © Copyright [employer] 2018 Information is for End User's use only and may not be sold, redistributed or otherwise used for commercial purposes   All illustrations and images included in CareNotes® are the copyrighted property of [employer]  or [address] [place] [address] [employer] / [employer]

## 2020-09-23 NOTE — PROGRESS NOTES
Assessment and Plan:     Problem List Items Addressed This Visit        Digestive    GERD without esophagitis     Controlled  Continue omeprazole  Relevant Medications    omeprazole (PriLOSEC) 20 mg delayed release capsule    Other Relevant Orders    CBC    Comprehensive metabolic panel       Respiratory    Seasonal allergic rhinitis due to pollen - Primary     Continue over-the-counter antihistamines and fluticasone as needed  Relevant Medications    ibuprofen (MOTRIN) 800 mg tablet    Mild intermittent asthma without complication     Stable  No recent exacerbations  Cardiovascular and Mediastinum    Migraine without aura and without status migrainosus, not intractable     Continue follow-up with Neurology  Continue Excedrin migraine for mild headaches and Maxalt for moderate to severe headaches  No longer on Paxil for preventative therapy  Relevant Medications    ibuprofen (MOTRIN) 800 mg tablet    rizatriptan (MAXALT) 10 MG tablet    cyclobenzaprine (FLEXERIL) 5 mg tablet    Other Relevant Orders    CBC    Comprehensive metabolic panel       Genitourinary    Benign prostatic hyperplasia with lower urinary tract symptoms     Stable  Continue to monitor clinically  Continue PSA screening  Relevant Orders    PSA, Total Screen       Other    Insomnia     Continue Xanax 1 mg at bedtime as needed for insomnia  Relevant Medications    ALPRAZolam (XANAX) 1 mg tablet    Chronic left shoulder pain    Relevant Medications    ibuprofen (MOTRIN) 800 mg tablet    cyclobenzaprine (FLEXERIL) 5 mg tablet    Chronic left-sided low back pain without sciatica     Continue NSAIDs and muscle relaxers as needed for mild-to-moderate pain and tramadol as needed for moderate to severe pain             Other Visit Diagnoses     Medicare annual wellness visit, subsequent        Screening for diabetes mellitus        Relevant Orders    Comprehensive metabolic panel    Screening for cardiovascular condition        Relevant Orders    Comprehensive metabolic panel    Lipid panel    Screening for colorectal cancer        Screening for prostate cancer        Relevant Orders    PSA, Total Screen        BMI Counseling: Body mass index is 24 43 kg/m²  The BMI is above normal  Nutrition recommendations include decreasing portion sizes, encouraging healthy choices of fruits and vegetables, decreasing fast food intake, consuming healthier snacks, limiting drinks that contain sugar, moderation in carbohydrate intake, increasing intake of lean protein, reducing intake of saturated and trans fat and reducing intake of cholesterol  Exercise recommendations include moderate physical activity 150 minutes/week and exercising 3-5 times per week  No pharmacotherapy was ordered  Patient referred to PCP due to patient being overweight  Depression Screening and Follow-up Plan: Patient's depression screening was positive with a PHQ-2 score of 0  Clincally patient does not have depression  No treatment is required  Falls Plan of Care: balance, strength, and gait training instructions were provided  Preventive health issues were discussed with patient, and age appropriate screening tests were ordered as noted in patient's After Visit Summary  Personalized health advice and appropriate referrals for health education or preventive services given if needed, as noted in patient's After Visit Summary       History of Present Illness:     Patient presents for Medicare Annual Wellness visit    Patient Care Team:  Uum Quevedo MD as PCP - General (Internal Medicine)  Toma Martin MD as Endoscopist     Problem List:     Patient Active Problem List   Diagnosis    GERD without esophagitis    Migraine without aura and without status migrainosus, not intractable    Seasonal allergic rhinitis due to pollen    Mild intermittent asthma without complication    Insomnia    Nephrolithiasis    Benign prostatic hyperplasia with lower urinary tract symptoms    Chronic left shoulder pain    Microscopic hematuria    Chronic left-sided low back pain without sciatica    Seborrheic keratoses      Past Medical and Surgical History:     Past Medical History:   Diagnosis Date    Acute reactive otitis externa of right ear 2019    Allergic +25 yrs    Asthma     Benign prostatic hyperplasia     Blood transfusion reaction     Bronchitis 2018    Cardiac arrest ([facility] )     oct 2000    Clotting disorder ([facility] ) 10/2000    treated    Diverticulitis of colon 20+ yrs    Diverticulosis     Elevated BP without diagnosis of hypertension 2018    GERD (gastroesophageal reflux disease)     Hepatitis A     History of colonic polyps     Internal bleeding         Kidney stone 2003    Migraine     Muscle tension headache 2018    Osteoarthritis     Pruritic dermatitis 2019    Seizures ([facility] )     Shingles 25 +yrs    Stroke ([facility] )     2003    Urinary tract infection     treated    Visual impairment      Past Surgical History:   Procedure Laterality Date    APPENDECTOMY      DENTAL SURGERY      wisdom tooth extraction    HEMORRHOID SURGERY      MYRINGOTOMY W/ TUBES      CO COLONOSCOPY FLX DX W/COLLJ SPEC WHEN PFRMD N/A 2017    Procedure: COLONOSCOPY;  Surgeon: Roman Caruso MD;  [facility]; Service: Colorectal    CO ESOPHAGOGASTRODUODENOSCOPY TRANSORAL DIAGNOSTIC N/A 2018    Procedure: ESOPHAGOGASTRODUODENOSCOPY (EGD); Surgeon: Bret Villalta MD;  [facility];   Service: Gastroenterology    PROSTATE SURGERY      TONSILLECTOMY      VASECTOMY        Family History:     Family History   Problem Relation Age of Onset    Cancer Father             Diabetes Father     Cancer Brother         colo-rectal, liver    Diabetes Mother     Lupus Sister         systemic-erythematosus    Diabetes Family     Kidney disease Family     Lupus Sister       Social History:        Social History     Socioeconomic History    Marital status:      Spouse name: None    Number of children: None    Years of education: None    Highest education level: None   Occupational History    None   Social Needs    Financial resource strain: None    Food insecurity     Worry: None     Inability: None    Transportation needs     Medical: None     Non-medical: None   Tobacco Use    Smoking status: Never Smoker    Smokeless tobacco: Never Used   Substance and Sexual Activity    Alcohol use: No    Drug use: No    Sexual activity: Never   Lifestyle    Physical activity     Days per week: None     Minutes per session: None    Stress: None   Relationships    Social connections     Talks on phone: None     Gets together: None     Attends Jainism service: None     Active member of club or organization: None     Attends meetings of clubs or organizations: None     Relationship status: None    Intimate partner violence     Fear of current or ex partner: None     Emotionally abused: None     Physically abused: None     Forced sexual activity: None   Other Topics Concern    None   Social History Narrative    None      Medications and Allergies:     Current Outpatient Medications   Medication Sig Dispense Refill    ALPRAZolam (XANAX) 1 mg tablet Take 1 tablet (1 mg total) by mouth daily at bedtime as needed for sleep 90 tablet 0    aspirin-acetaminophen-caffeine (EXCEDRIN MIGRAINE) 250-250-65 MG per tablet Take 1 tablet by mouth every 6 (six) hours as needed for headaches        b complex vitamins capsule Take 1 capsule by mouth daily      cyclobenzaprine (FLEXERIL) 5 mg tablet Take 1 tablet (5 mg total) by mouth daily at bedtime as needed for muscle spasms 90 tablet 1    Fenoprofen Calcium 400 MG CAPS Take 1 capsule by mouth daily      fluticasone (FLOVENT HFA) 220 mcg/act inhaler Inhale 1 puff 2 (two) times a day (Patient taking differently: Inhale 1 puff 2 (two) times a day as needed  ) 3 Inhaler 3    ibuprofen (MOTRIN) 800 mg tablet Take 1 tablet (800 mg total) by mouth every 12 (twelve) hours as needed for mild pain 180 tablet 1    Multiple Vitamins-Minerals (MULTIVITAMIN WITH MINERALS) tablet Take 1 tablet by mouth daily      omeprazole (PriLOSEC) 20 mg delayed release capsule Take 1 capsule (20 mg total) by mouth daily 90 capsule 1    Potassium 95 MG TABS Take 1 tablet by mouth daily      rizatriptan (MAXALT) 10 MG tablet Take 1 tablet (10 mg) prn for migraine, may repeat in 2 hrs (Max 3/day) 90 tablet 1    traMADol (ULTRAM) 50 mg tablet Take 1 tablet (50 mg total) by mouth every 12 (twelve) hours as needed for moderate pain or severe pain 60 tablet 0    aspirin 81 MG tablet Take 81 mg by mouth daily         No current facility-administered medications for this visit        Allergies   Allergen Reactions    Dust Mite Extract Shortness Of Breath and Allergic Rhinitis    Iodinated Diagnostic Agents Anaphylaxis    Pollen Extract Shortness Of Breath and Allergic Rhinitis    Other      Annotation - [id number]: petrolium derivatives, and perfume    Chlorzoxazone Rash    Codeine Itching and Rash      Immunizations:     Immunization History   Administered Date(s) Administered    Pneumococcal Conjugate 13-Valent 05/15/2019      Health Maintenance:         Topic Date Due    Hepatitis C Screening  Completed         Topic Date Due    DTaP,Tdap,and Td Vaccines (1 - Tdap) 12/08/1971    Pneumococcal Vaccine: 65+ Years (2 of 2 - PPSV23) 05/15/2020    Influenza Vaccine  07/01/2020      Medicare Health Risk Assessment:     /76 (BP Location: Left arm, Patient Position: Sitting, Cuff Size: Standard) Comment: Verbal by patient Comment (BP Location): Verbal by patient Comment (Patient Position): Verbal by patient Comment (Cuff Size): Verbal by patient  Pulse 89 Comment: Verbal by patient  Temp 97 5 °F (36 4 °C) (Oral) Comment: Verbal by patient Comment (Src): Verbal by patient  Ht 5' 7" (1 702 m) Comment: Verbal by patient  Wt 70 8 kg (156 lb) Comment: Verbal by patient  BMI 24 43 kg/m²      Jennifer Marx is here for his Subsequent Wellness visit  Last Medicare Wellness visit information reviewed, patient interviewed and updates made to the record today  Health Risk Assessment:   Patient rates overall health as excellent  Patient feels that their physical health rating is much better  Eyesight was rated as slightly worse  Hearing was rated as same  Patient feels that their emotional and mental health rating is same  Pain experienced in the last 7 days has been some  Patient's pain rating has been 8/10  Lower back pain    Depression Screening:   PHQ-2 Score: 0      Fall Risk Screening: In the past year, patient has experienced: history of falling in past year    Number of falls: 1  Injured during fall?: Yes    Feels unsteady when standing or walking?: No    Worried about falling?: No      Home Safety:  Patient does not have trouble with stairs inside or outside of their home  Patient has working smoke alarms and has working carbon monoxide detector  Home safety hazards include: none  Nutrition:   Current diet is Regular  Medications:   Patient is currently taking over-the-counter supplements  OTC medications include: see medication list  Patient is able to manage medications  Activities of Daily Living (ADLs)/Instrumental Activities of Daily Living (IADLs):   Walk and transfer into and out of bed and chair?: Yes  Dress and groom yourself?: Yes    Bathe or shower yourself?: Yes    Feed yourself?  Yes  Do your laundry/housekeeping?: Yes  Manage your money, pay your bills and track your expenses?: Yes  Make your own meals?: Yes    Do your own shopping?: Yes    Previous Hospitalizations:   Any hospitalizations or ED visits within the last 12 months?: No      Advance Care Planning:   Living will: Yes    Advanced directive: Yes    Advanced directive counseling given: Yes    End of Life Decisions reviewed with patient: Yes    Provider agrees with end of life decisions: Yes      Comments: Discussed advanced directives with patient: DNR/DNI    Cognitive Screening:   Provider or family/friend/caregiver concerned regarding cognition?: No    PREVENTIVE SCREENINGS      Cardiovascular Screening:    General: Risks and Benefits Discussed and Screening Current    Due for: Lipid Panel      Diabetes Screening:     General: Risks and Benefits Discussed and Screening Current    Due for: Blood Glucose      Colorectal Cancer Screening:     General: Screening Current      Prostate Cancer Screening:    General: Risks and Benefits Discussed and Screening Current    Due for: PSA      Osteoporosis Screening:    General: Screening Not Indicated      Abdominal Aortic Aneurysm (AAA) Screening:    Risk factors include: age between 73-69 yo        General: Screening Not Indicated      Lung Cancer Screening:     General: Screening Not Indicated      Hepatitis C Screening:    General: Screening Current    Other Counseling Topics:   Alcohol use counseling, car/seat belt/driving safety, skin self-exam, sunscreen and calcium and vitamin D intake and regular weightbearing exercise  Christina Alvarez MD  Virtual AWV Consent    Chief Complaint   Patient presents with    Follow-up     Patient is here for f/u chronic conditions  Pt c/o lower back pain on both sides  Pain started getting worse 10 months ago  Pt would like to increase the dose for Xanax 2mg  Pt would like to have a Rx for Ciprofloxacin   Medicare Wellness Visit     SUB AWV    Virtual Awv    Virtual Regular Visit         Encounter provider Christina Alvarez MD    Provider located at [address] [place] [id number]      Recent Visits  No visits were found meeting these conditions     Showing recent visits within past 7 days and meeting all other requirements     Today's Visits  Date Type Provider Dept   09/23/20 Telemedicine Nedra Bates MD [facility]   Showing today's visits and meeting all other requirements     Future Appointments  No visits were found meeting these conditions  Showing future appointments within next 150 days and meeting all other requirements        After connecting through televideo, the patient was identified by name and date of birth  Claudia Elizondo was informed that this is a telemedicine visit and that the visit is being conducted through [facility] and patient was informed that this is not a secure, HIPAA-complaint platform  He agrees to proceed  My office door was closed  No one else was in the room  He acknowledged consent and understanding of privacy and security of the video platform  The patient has agreed to participate and understands they can discontinue the visit at any time    Patient is aware this is a billable service

## 2020-10-26 ENCOUNTER — TELEMEDICINE (OUTPATIENT)
Dept: INTERNAL MEDICINE CLINIC | Facility: CLINIC | Age: 70
End: 2020-10-26
Payer: MEDICARE

## 2020-10-26 DIAGNOSIS — M25.512 CHRONIC LEFT SHOULDER PAIN: ICD-10-CM

## 2020-10-26 DIAGNOSIS — M54.42 ACUTE LEFT-SIDED LOW BACK PAIN WITH LEFT-SIDED SCIATICA: Primary | ICD-10-CM

## 2020-10-26 DIAGNOSIS — G89.29 CHRONIC LEFT SHOULDER PAIN: ICD-10-CM

## 2020-10-26 PROCEDURE — 1124F ACP DISCUSS-NO DSCNMKR DOCD: CPT | Performed by: INTERNAL MEDICINE

## 2020-10-26 PROCEDURE — 99213 OFFICE O/P EST LOW 20 MIN: CPT | Performed by: INTERNAL MEDICINE

## 2020-10-26 RX ORDER — CYCLOBENZAPRINE HCL 5 MG
5 TABLET ORAL
Qty: 30 TABLET | Refills: 0 | Status: SHIPPED | OUTPATIENT
Start: 2020-10-26

## 2020-11-10 ENCOUNTER — TELEPHONE (OUTPATIENT)
Dept: INTERNAL MEDICINE CLINIC | Facility: CLINIC | Age: 70
End: 2020-11-10

## 2021-03-09 DIAGNOSIS — M25.512 CHRONIC LEFT SHOULDER PAIN: ICD-10-CM

## 2021-03-09 DIAGNOSIS — G89.29 CHRONIC LEFT SHOULDER PAIN: ICD-10-CM

## 2021-03-09 RX ORDER — IBUPROFEN 800 MG/1
800 TABLET ORAL EVERY 12 HOURS PRN
Qty: 180 TABLET | Refills: 1 | Status: SHIPPED | OUTPATIENT
Start: 2021-03-09

## 2021-03-12 DIAGNOSIS — G47.00 INSOMNIA, UNSPECIFIED TYPE: ICD-10-CM

## 2021-03-16 RX ORDER — ALPRAZOLAM 1 MG/1
1 TABLET ORAL
Qty: 90 TABLET | Refills: 0 | OUTPATIENT
Start: 2021-03-16

## 2021-05-25 DIAGNOSIS — G89.29 CHRONIC LEFT SHOULDER PAIN: ICD-10-CM

## 2021-05-25 DIAGNOSIS — M25.512 CHRONIC LEFT SHOULDER PAIN: ICD-10-CM

## 2021-05-25 RX ORDER — CYCLOBENZAPRINE HCL 5 MG
5 TABLET ORAL
Qty: 90 TABLET | Refills: 1 | OUTPATIENT
Start: 2021-05-25

## 2022-01-27 ENCOUNTER — TELEPHONE (OUTPATIENT)
Dept: INTERNAL MEDICINE CLINIC | Facility: OTHER | Age: 72
End: 2022-01-27

## 2022-02-03 NOTE — TELEPHONE ENCOUNTER
Patient states that he lives in [facility] now and the last visit he did with Dr Cyndi Arzate was a virtual   I made him aware that we are not allowed to do virtuals anymore unless the patient is in the state  He stated that he wanted us to check with Dr Cyndi Arzate to see if there were any alternatives for him to have the visit because he is available for a visit whenever